# Patient Record
Sex: FEMALE | Race: BLACK OR AFRICAN AMERICAN | Employment: FULL TIME | URBAN - METROPOLITAN AREA
[De-identification: names, ages, dates, MRNs, and addresses within clinical notes are randomized per-mention and may not be internally consistent; named-entity substitution may affect disease eponyms.]

---

## 2017-05-08 ENCOUNTER — HOSPITAL ENCOUNTER (EMERGENCY)
Facility: CLINIC | Age: 27
Discharge: HOME | End: 2017-05-08
Attending: INTERNAL MEDICINE | Admitting: INTERNAL MEDICINE
Payer: COMMERCIAL

## 2017-05-08 VITALS
HEART RATE: 69 BPM | RESPIRATION RATE: 16 BRPM | TEMPERATURE: 97.7 F | OXYGEN SATURATION: 97 % | DIASTOLIC BLOOD PRESSURE: 82 MMHG | WEIGHT: 138.19 LBS | SYSTOLIC BLOOD PRESSURE: 126 MMHG

## 2017-05-08 DIAGNOSIS — J06.9 ACUTE URI: ICD-10-CM

## 2017-05-08 PROCEDURE — 99283 EMERGENCY DEPT VISIT LOW MDM: CPT | Mod: Z6 | Performed by: INTERNAL MEDICINE

## 2017-05-08 PROCEDURE — 99282 EMERGENCY DEPT VISIT SF MDM: CPT | Performed by: INTERNAL MEDICINE

## 2017-05-08 RX ORDER — HYDROXYZINE PAMOATE 50 MG/1
50 CAPSULE ORAL 3 TIMES DAILY PRN
Qty: 30 CAPSULE | Refills: 0 | Status: SHIPPED | OUTPATIENT
Start: 2017-05-08 | End: 2018-01-09

## 2017-05-08 RX ORDER — IBUPROFEN 200 MG
400 TABLET ORAL EVERY 8 HOURS PRN
Qty: 30 TABLET | Refills: 0 | Status: SHIPPED | OUTPATIENT
Start: 2017-05-08 | End: 2017-07-26

## 2017-05-08 ASSESSMENT — ENCOUNTER SYMPTOMS
FEVER: 1
COUGH: 1

## 2017-05-08 NOTE — ED PROVIDER NOTES
"    [facility] EMERGENCY DEPARTMENT ([facility])    May 8, 2017    ED 20 6:34 PM   History     Chief Complaint   Patient presents with     Nasal Congestion     Onset 2 days ago with nasal congestion, runny nose, watery eyes, fever, \"I can't breath my nose is stuffed up.\"     HPI  Tiara Andrade is a 27 year old female who presents with nasal congestion, subjective fevers for the past 2-3 days. She denies itching. She has mild cough. No other complaints. She tried Radha [place] without relief and symptoms got worse this morning. The worst symptoms not being able to breathe from her nose. She asks if she could have a work note.     I have reviewed the Medications, Allergies, Past Medical and Surgical History, and Social History in the [employer] system.  PAST MEDICAL HISTORY: History reviewed. No pertinent past medical history.    PAST SURGICAL HISTORY: History reviewed. No pertinent surgical history.    FAMILY HISTORY: No family history on file.    SOCIAL HISTORY:   Social History   Substance Use Topics     Smoking status: Not on file     Smokeless tobacco: Not on file     Alcohol use Not on file       Discharge Medication List as of 5/8/2017  6:58 PM      START taking these medications    Details   ibuprofen (ADVIL/MOTRIN) 200 MG tablet Take 2 tablets (400 mg) by mouth every 8 hours as needed for mild pain, Disp-30 tablet, R-0, Local Print      hydrOXYzine (VISTARIL) 50 MG capsule Take 1 capsule (50 mg) by mouth 3 times daily as needed for other (congestion), Disp-30 capsule, R-0, Local Print              No Known Allergies     Review of Systems   Constitutional: Positive for fever (subjective).   HENT: Positive for congestion.    Respiratory: Positive for cough.        Physical Exam   BP: 116/69  Pulse: 65  Heart Rate: 65  Temp: 98.2  F (36.8  C)  Resp: 16  Weight: 62.7 kg (138 lb 3 oz)  SpO2: 98 %  Physical Exam   Constitutional: She is oriented to person, place, and time. No distress.   HENT:   Head: Atraumatic. "   Mouth/Throat: Oropharynx is clear and moist. No oropharyngeal exudate.   Eyes: Pupils are equal, round, and reactive to light. No scleral icterus.   Neck: Neck supple. No JVD present.   Cardiovascular: Normal rate, normal heart sounds and intact distal pulses.  Exam reveals no gallop and no friction rub.    No murmur heard.  Pulmonary/Chest: Effort normal and breath sounds normal. No respiratory distress. She has no wheezes. She has no rales. She exhibits no tenderness.   Abdominal: Soft. Bowel sounds are normal. She exhibits no distension and no mass. There is no tenderness. There is no rebound and no guarding.   Musculoskeletal: She exhibits no edema or tenderness.   Neurological: She is alert and oriented to person, place, and time. No cranial nerve deficit.   Skin: Skin is warm. No rash noted. She is not diaphoretic.       ED Course     ED Course     Procedures        No results found for this visit on 05/08/17 (from the past 24 hour(s)).          Labs Ordered and Resulted from Time of ED Arrival Up to the Time of Departure from the ED - No data to display         Assessments & Plan (with Medical Decision Making)  Acute URI, no suggestion of allergic etiology, will start ibuprofen and vistrail prn for congestion, follow up with her PMD prn.       I have reviewed the nursing notes.    I have reviewed the findings, diagnosis, plan and need for follow up with the patient.    Discharge Medication List as of 5/8/2017  6:58 PM      START taking these medications    Details   ibuprofen (ADVIL/MOTRIN) 200 MG tablet Take 2 tablets (400 mg) by mouth every 8 hours as needed for mild pain, Disp-30 tablet, R-0, Local Print      hydrOXYzine (VISTARIL) 50 MG capsule Take 1 capsule (50 mg) by mouth 3 times daily as needed for other (congestion), Disp-30 capsule, R-0, Local Print             Final diagnoses:   Acute URI       5/8/2017   [facility], [place], EMERGENCY DEPARTMENT     Jluis Dubon MD  05/08/17 5977

## 2017-05-08 NOTE — ED AVS SNAPSHOT
[facility], Emergency Department    [address]    [place] [address]    Phone:  [phone]    Fax:  [phone]                                       Tiara Andrade   MRN: [id number]    Department:  [facility], Emergency Department   Date of Visit:  5/8/2017           Patient Information     Date Of Birth          1990        Your diagnoses for this visit were:     Acute URI        You were seen by Jluis Dubon MD.        Discharge Instructions         Viral Upper Respiratory Illness (Adult)  You have a viral upper respiratory illness (URI), which is another term for the common cold. This illness is contagious during the first few days. It is spread through the air by coughing and sneezing. It may also be spread by direct contact (touching the sick person and then touching your own eyes, nose, or mouth). Frequent handwashing will decrease risk of spread. Most viral illnesses go away within 7 to 10 days with rest and simple home remedies. Sometimes the illness may last for several weeks. Antibiotics will not kill a virus, and they are generally not prescribed for this condition.    Home care    If symptoms are severe, rest at home for the first 2 to 3 days. When you resume activity, don't let yourself get too tired.    Avoid being exposed to cigarette smoke (yours or others ).    You may use acetaminophen or ibuprofen to control pain and fever, unless another medicine was prescribed. (Note: If you have chronic liver or kidney disease, have ever had a stomach ulcer or gastrointestinal bleeding, or are taking blood-thinning medicines, talk with your healthcare provider before using these medicines.) Aspirin should never be given to anyone under 18 years of age who is ill with a viral infection or fever. It may cause severe liver or brain damage.    Your appetite may be poor, so a light diet is fine. Avoid dehydration by drinking 6 to 8 glasses of fluids per day (water, soft drinks, juices, tea,  or soup). Extra fluids will help loosen secretions in the nose and lungs.    Over-the-counter cold medicines will not shorten the length of time you re sick, but they may be helpful for the following symptoms: cough, sore throat, and nasal and sinus congestion. (Note: Do not use decongestants if you have high blood pressure.)  Follow-up care  Follow up with your healthcare provider, or as advised.  When to seek medical advice  Call your healthcare provider right away if any of these occur:    Cough with lots of colored sputum (mucus)    Severe headache; face, neck, or ear pain    Difficulty swallowing due to throat pain    Fever of 100.4 F (38 C)  Call 911, or get immediate medical care  Call emergency services right away if any of these occur:    Chest pain, shortness of breath, wheezing, or difficulty breathing    Coughing up blood    Inability to swallow due to throat pain    9972-1156 [employer]. [address] [place] [address]. All rights reserved. This information is not intended as a substitute for professional medical care. Always follow your healthcare professional's instructions.      Please make an appointment to follow up with Your Primary Care Provider in 3-7 days if not improving.     24 Hour Appointment Hotline       To make an appointment at any [facility], call [phone] ([phone]). If you don't have a family doctor or clinic, we will help you find one. [facility] are conveniently located to serve the needs of you and your family.             Review of your medicines      START taking        Dose / Directions Last dose taken    hydrOXYzine 50 MG capsule   Commonly known as:  VISTARIL   Dose:  50 mg   Quantity:  30 capsule        Take 1 capsule (50 mg) by mouth 3 times daily as needed for other (congestion)   Refills:  0        ibuprofen 200 MG tablet   Commonly known as:  ADVIL/MOTRIN   Dose:  400 mg   Quantity:  30 tablet        Take 2 tablets (400 mg)  "by mouth every 8 hours as needed for mild pain   Refills:  0                Prescriptions were sent or printed at these locations (2 Prescriptions)                   Other Prescriptions                Printed at Department/Unit printer (2 of 2)         ibuprofen (ADVIL/MOTRIN) 200 MG tablet               hydrOXYzine (VISTARIL) 50 MG capsule                Orders Needing Specimen Collection     None      Pending Results     No orders found from 2017 to 2017.            Pending Culture Results     No orders found from 2017 to 2017.            Pending Results Instructions     If you had any lab results that were not finalized at the time of your Discharge, you can call the ED Lab Result RN at [phone]. You will be contacted by this team for any positive Lab results or changes in treatment. The nurses are available 7 days a week from 10A to 6:30P.  You can leave a message 24 hours per day and they will return your call.        Thank you for choosing [facility]       Thank you for choosing [facility] for your care. Our goal is always to provide you with excellent care. Hearing back from our patients is one way we can continue to improve our services. Please take a few minutes to complete the written survey that you may receive in the mail after you visit with us. Thank you!        [employer] Information     [employer] lets you send messages to your doctor, view your test results, renew your prescriptions, schedule appointments and more. To sign up, go to [email or web address] . Click on \"Log in\" on the left side of the screen, which will take you to the Welcome page. Then click on \"Sign up Now\" on the right side of the page.     You will be asked to enter the access code listed below, as well as some personal information. Please follow the directions to create your username and password.     Your access code is: [email or web address]  Expires: 2017  6:58 PM     Your access code will  in 90 days. If you need " help or a new code, please call your [facility] clinic or [phone].        Care EveryWhere ID     This is your Care EveryWhere ID. This could be used by other organizations to access your [facility] medical records  [id number]        After Visit Summary       This is your record. Keep this with you and show to your community pharmacist(s) and doctor(s) at your next visit.

## 2017-05-08 NOTE — ED AVS SNAPSHOT
[facility], [place], Emergency Department    [address]    [facility] [address]    Phone:  [phone]    Fax:  [phone]                                       Tiara Andrade   MRN: [id number]    Department:  [facility], Emergency Department   Date of Visit:  5/8/2017           After Visit Summary Signature Page     I have received my discharge instructions, and my questions have been answered. I have discussed any challenges I see with this plan with the nurse or doctor.    ..........................................................................................................................................  Patient/Patient Representative Signature      ..........................................................................................................................................  Patient Representative Print Name and Relationship to Patient    ..................................................               ................................................  Date                                            Time    ..........................................................................................................................................  Reviewed by Signature/Title    ...................................................              ..............................................  Date                                                            Time

## 2017-05-08 NOTE — DISCHARGE INSTRUCTIONS

## 2017-05-08 NOTE — LETTER
[facility], [place], EMERGENCY DEPARTMENT  [address] [facility] [address] [facility] [address]  [phone]    May 8, 2017    Tiara Andrade  No address on file.  There are no phone numbers on file.    : 1990      To Whom it may concern:    Tiara Andrade was seen in our Emergency Department today, May 8, 2017.  I expect her condition to improve over the next 2 days.  She may return to work/school when improved.    Sincerely,        Jluis Dubon MD

## 2017-06-27 ENCOUNTER — OFFICE VISIT (OUTPATIENT)
Dept: FAMILY MEDICINE | Facility: CLINIC | Age: 27
End: 2017-06-27

## 2017-06-27 VITALS
SYSTOLIC BLOOD PRESSURE: 104 MMHG | OXYGEN SATURATION: 96 % | WEIGHT: 136 LBS | HEIGHT: 66 IN | HEART RATE: 71 BPM | RESPIRATION RATE: 18 BRPM | DIASTOLIC BLOOD PRESSURE: 67 MMHG | BODY MASS INDEX: 21.86 KG/M2 | TEMPERATURE: 98.5 F

## 2017-06-27 DIAGNOSIS — F41.1 GAD (GENERALIZED ANXIETY DISORDER): Primary | ICD-10-CM

## 2017-06-27 DIAGNOSIS — K21.9 GASTROESOPHAGEAL REFLUX DISEASE, ESOPHAGITIS PRESENCE NOT SPECIFIED: ICD-10-CM

## 2017-06-27 LAB
HEMOGLOBIN: 14.1 G/DL (ref 11.7–15.7)
TSH SERPL DL<=0.005 MIU/L-ACNC: 0.66 MU/L (ref 0.4–4)

## 2017-06-27 RX ORDER — HYDROXYZINE HYDROCHLORIDE 25 MG/1
25 TABLET, FILM COATED ORAL EVERY 6 HOURS PRN
Qty: 60 TABLET | Refills: 1 | Status: SHIPPED | OUTPATIENT
Start: 2017-06-27 | End: 2018-01-09

## 2017-06-27 ASSESSMENT — ENCOUNTER SYMPTOMS
FEVER: 0
BLOOD IN STOOL: 0
DIARRHEA: 0
DYSPHORIC MOOD: 0
ENDOCRINE NEGATIVE: 1
NAUSEA: 1
CONSTIPATION: 0
SLEEP DISTURBANCE: 0
DECREASED CONCENTRATION: 1
EYES NEGATIVE: 1
CHEST TIGHTNESS: 1
CONFUSION: 0
PALPITATIONS: 1
NERVOUS/ANXIOUS: 1
ACTIVITY CHANGE: 1
FATIGUE: 1
TREMORS: 1
APPETITE CHANGE: 0
UNEXPECTED WEIGHT CHANGE: 0
HEADACHES: 1
HEMATOLOGIC/LYMPHATIC NEGATIVE: 1
ALLERGIC/IMMUNOLOGIC NEGATIVE: 1
HALLUCINATIONS: 0
DIAPHORESIS: 1
ABDOMINAL PAIN: 1
MUSCULOSKELETAL NEGATIVE: 1

## 2017-06-27 ASSESSMENT — ANXIETY QUESTIONNAIRES
3. WORRYING TOO MUCH ABOUT DIFFERENT THINGS: MORE THAN HALF THE DAYS
7. FEELING AFRAID AS IF SOMETHING AWFUL MIGHT HAPPEN: MORE THAN HALF THE DAYS
1. FEELING NERVOUS, ANXIOUS, OR ON EDGE: MORE THAN HALF THE DAYS
2. NOT BEING ABLE TO STOP OR CONTROL WORRYING: MORE THAN HALF THE DAYS
6. BECOMING EASILY ANNOYED OR IRRITABLE: MORE THAN HALF THE DAYS
IF YOU CHECKED OFF ANY PROBLEMS ON THIS QUESTIONNAIRE, HOW DIFFICULT HAVE THESE PROBLEMS MADE IT FOR YOU TO DO YOUR WORK, TAKE CARE OF THINGS AT HOME, OR GET ALONG WITH OTHER PEOPLE: VERY DIFFICULT
5. BEING SO RESTLESS THAT IT IS HARD TO SIT STILL: MORE THAN HALF THE DAYS
GAD7 TOTAL SCORE: 14

## 2017-06-27 ASSESSMENT — PATIENT HEALTH QUESTIONNAIRE - PHQ9: 5. POOR APPETITE OR OVEREATING: MORE THAN HALF THE DAYS

## 2017-06-27 NOTE — PATIENT INSTRUCTIONS
Here is the plan from today's visit    1. JASON (generalized anxiety disorder)  Take hydroxyzine in the morning as needed for anxiety. Make an appointment with therapy, and we will follow-up in one week.  Labs to look for anemia or thyroid problem.  - BEHAVIORAL HEALTH REFERRAL ([facility] interal and external)  - hydrOXYzine (ATARAX) 25 MG tablet; Take 1 tablet (25 mg) by mouth every 6 hours as needed for anxiety  Dispense: 60 tablet; Refill: 1  - TSH with free T4 reflex  - Hemoglobin (HGB) ([facility])    2. Gastroesophageal reflux disease, esophagitis presence not specified  Take zantac twice daily for heart burn. Will follow-up in one week. Decrease coffee intake to help with heart burn and anxiety. Consider quitting tobacco in the future as well to help.  - ranitidine (ZANTAC) 150 MG tablet; Take 1 tablet (150 mg) by mouth 2 times daily  Dispense: 60 tablet; Refill: 11    3. Pap test  Make appointment for follow-up and pap test in one week.    Please call or return to clinic if your symptoms don't go away.    Follow up plan  Schedule an appointment for one week.    Thank you for coming to [facility] today.  Lab Testing:  **If you had lab testing today and your results are reassuring or normal they will be mailed to you or sent through [employer] within 7 days.   **If the lab tests need quick action we will call you with the results.  The phone number we will call with results is # [phone] (home) . If this is not the best number please call our clinic and change the number.  Medication Refills:  If you need any refills please call your pharmacy and they will contact us.   If you need to  your refill at a new pharmacy, please contact the new pharmacy directly. The new pharmacy will help you get your medications transferred faster.   Scheduling:  If you have any concerns about today's visit or wish to schedule another appointment please call our office during normal business hours [phone] (8-5:00  M-F)  If a referral was made to a [facility] and you don't get a call from central scheduling please call [phone].  If a Mammogram was ordered for you at [facility] call [phone] to schedule or change your appointment.  If you had an XRay/CT/Ultrasound/MRI ordered the number is [phone] to schedule or change your radiology appointment.   Medical Concerns:  If you have urgent medical concerns please call [phone] at any time of the day.

## 2017-06-27 NOTE — LETTER
June 29, 2017      Tiara Andrade  [address] [address]        Dear Tiara,    Thank you for getting your care at [facility]. Please see below for your test results.    Your thyroid function is normal, and your hemoglobin (red blood cell) number is normal, you are not anemic.     Resulted Orders   TSH with free T4 reflex   Result Value Ref Range    TSH 0.66 0.40 - 4.00 mU/L   Hemoglobin (HGB) ([facility])   Result Value Ref Range    Hemoglobin 14.1 11.7 - 15.7 g/dL       If you have any questions, please call the clinic to make an appointment with me for a clinic visit.    Sincerely,    Nona Cullen MD

## 2017-06-27 NOTE — PROGRESS NOTES
"      HPI:       Tiara Andrade is a 27 year old woman who presents today to [facility] care. She has previously been seen at [facility] before coming here. She also has a couple of concerns that she wanted to address today. First, over the last year she has been very anxious. She describes feeling scared all the time for no reason. She feels hot and her heart starts racing. She gets tremors and starts to sweat. She feels \"foggy\" and has decreased concentration. Her anxiety is worst on her drives when going to work. She is unable to focus at work and sometimes wants to skip going to work altogether. She describes sometimes having to go upstairs and check her room multiple times before going to work and she does not know why. She has decreased interest in daily activities but denies feeling hopeless. She feels tired all the time and sleeps more than usual. She denies suicidal ideation. She drinks 24 oz of coffee which makes the anxiety worse at times. She was on 5mg adderall daily for ADHD but has not taken it for a month because it makes her anxiety worse. She smokes half a pack of cigarettes per day which helps calm her down. She has never seen a therapist or been on any medications for depression or anxiety. She was taking vistaril for allergies, which made her sleepy and she didn't notice any effects on anxiety. A year ago, she went through a difficult break up. She works in customer service at [employer]. She does not drink or use other drugs. A brain MRI was done in 2016 at [facility] and was normal.    Also, she has had heartburn that has been getting worse lately. She had taken prilosec in the past although not consistently. She has chest and epigastric pain that is worst in the morning and after meals. It is associated with nausea and a sour taste in her mouth. She denies changes in her bowel movements and has one soft stool everyday. She has tried quitting smoking a few times in the past, and this would be " something she would try in the future once her anxiety is under better control.    At [facility], she had a pap test with LSIL and HPV positive cotesting in 12/2015. She has a normal colposcopy on 1/2016 without biopsies. She was due for repeat pap test in 12/2016 but has not gotten one yet. She had a Nexplanon from 12/30/2015 to 3/2016. She does not want anything for contraception at this time and is currently single. She has never been pregnant before.    GERD/Heartburn     Onset: 2-3 years    Description:     Burning in chest:Yes Details: upper abdomen area     Intensity: moderate, severe    Progression of Symptoms: waxing and waning    Accompanying Signs & Symptoms:  Does it feel like food gets stuck:no  Nausea: Yes Details: in morning   Vomiting (bloody?): Yes Details: sometimes the color is green  Abdominal Pain: Yes  Black-Tarry stools:no  Bloody stools: no   History:   Previous ulcers: no    Precipitating factors:   Caffeine use: Yes Details: a lot of coffee  Alcohol use: no  NSAID/Aspirin use: no  Tobacco use: Yes   Worse with caffeinated drinks and no particular food or drink.    Alleviating factors:  Drink water, Details:helps somewhat with Sx.          Therapies Tried and outcome:lifestyle change including: avoiding caffeinated and smoking        Mood Symptoms     Concerns: anxiety    How long have you been feeling this way? One year    Description:   Depressed Mood?:No   Anxious Mood?:  YES     Accompanying Signs & Symptoms:  Still participating in activities that you used to enjoy?: No  Fatigue:  YES - sleeps more than usual  Irritability: No   Difficulty concentrating:  YES difficult to focus at work, history of ADHD not on adderall  Changes in appetite: No   Problems with sleep: No   ++++++++++++++++++++++++++++++++      Substance Use: No        Alcohol Use:never                Other drug use:  Never Used    Social Support           Who do you live with? alone          Who do you turn to for support?  "friends         Resources         What makes you feel better?: friends         What do you do to manage stress? sleep             History  Has there been a time in your life when you needed much less sleep than usual? No   Heart racing/beating fast :  YES especially while driving or going to work  Thoughts of hurting yourself or others: No   Previous treatment Antidepressants - none                                  Therapy: No  Today's PHQ-9 Score:   No flowsheet data found.    JASON Score:  JASON-7 SCORE 6/27/2017   Total Score 14          Problem, Medication and Allergy Lists were reviewed and are current.  Patient is an established patient of this clinic.         Review of Systems:   Review of Systems   Constitutional: Positive for activity change, diaphoresis and fatigue. Negative for appetite change, fever and unexpected weight change.   Eyes: Negative.    Respiratory: Positive for chest tightness.    Cardiovascular: Positive for palpitations.   Gastrointestinal: Positive for abdominal pain and nausea. Negative for blood in stool, constipation and diarrhea.   Endocrine: Negative.    Genitourinary: Negative.    Musculoskeletal: Negative.    Skin: Negative.    Allergic/Immunologic: Negative.    Neurological: Positive for tremors and headaches.   Hematological: Negative.    Psychiatric/Behavioral: Positive for decreased concentration. Negative for confusion, dysphoric mood, hallucinations, self-injury, sleep disturbance and suicidal ideas. The patient is nervous/anxious.              Physical Exam:     Patient Vitals for the past 24 hrs:   BP Temp Temp src Pulse Resp SpO2 Height Weight   06/27/17 1338 104/67 98.5  F (36.9  C) Oral 71 18 96 % 5' 6\" (167.6 cm) 136 lb (61.7 kg)     Body mass index is 21.95 kg/(m^2).  Vitals were reviewed and were normal     Physical Exam   Constitutional: She is oriented to person, place, and time. She appears well-developed and well-nourished. No distress.   HENT:   Head: Normocephalic " and atraumatic.   Mouth/Throat: Oropharynx is clear and moist. No oropharyngeal exudate.   Eyes: Pupils are equal, round, and reactive to light. Right eye exhibits no discharge. Left eye exhibits no discharge. No scleral icterus.   Neck: Normal range of motion. Neck supple.   Cardiovascular: Normal rate, regular rhythm and normal heart sounds.  Exam reveals no gallop and no friction rub.    No murmur heard.  Pulmonary/Chest: Effort normal and breath sounds normal. No respiratory distress. She has no wheezes. She has no rales.   Abdominal: Soft. Bowel sounds are normal. She exhibits no mass. There is tenderness (Mild diffuse tenderness to palpation). There is no rebound and no guarding.   Musculoskeletal: Normal range of motion. She exhibits no edema, tenderness or deformity.   Neurological: She is alert and oriented to person, place, and time. She exhibits normal muscle tone.   Skin: Skin is warm and dry. No rash noted. She is not diaphoretic. No erythema. No pallor.   Psychiatric: Her behavior is normal. Judgment and thought content normal.   Anxious     MENTAL STATUS EXAM  Appearance: appropriate  Attitude: cooperative  Behavior: normal  Eye Contact: normal  Speech: normal  Orientation: oriented to person, place, time and situation  Mood: normal and anxious  Affect: Congruent with mood  Thought Process: appropriate  Suicidal Ideation: reports no suicidal ideation  Hallucination: denies      Results:     Results for orders placed or performed in visit on 06/27/17   TSH with free T4 reflex   Result Value Ref Range    TSH 0.66 0.40 - 4.00 mU/L   Hemoglobin (HGB) ([facility])   Result Value Ref Range    Hemoglobin 14.1 11.7 - 15.7 g/dL         Assessment and Plan     Tiara Andrade is a 27 year old woman who presents today to [facility] care.    1. JASON (generalized anxiety disorder)  Assessment: Her history is most indicative of JASON. Her PHQ9 was 8, and she denies feeling depressed although she lacks motivation and sleeps a  "lot. She has rituals of checking her room several times before she leaves the house as well, which may be an obsessive compulsive component of her anxiety, but sounds more like \"I went into the room then forgot why I went in their\". She denies symptoms of psychosis or suicidal ideation. We spoke to her extensively about starting a medication such as zoloft or cymbalta, and she was very resistant. She has friends on SSRIs, and says that it affects their mood and personality negatively, and she doesn't want that to happen to her. She was open to taking a lower dose of hydroxyzine daily and seeing a therapist though. She agreed to come back for a mood check in one week. We will continue to assess her willingness to begin an SSRI for anxiety. We also counseled her on decreasing her caffeine intake since it is contributing to her anxiety. A TSH and hemoglobin were ordered today as well to evaluate for anemia or hypo/hyperthyroidism as a contributing factor of her symptoms.  Plan:  -     BEHAVIORAL HEALTH REFERRAL ([facility] interal and external)  -     hydrOXYzine (ATARAX) 25 MG tablet; Take 1 tablet (25 mg) by mouth every 6 hours as needed for anxiety  -     TSH with free T4 reflex  -     Hemoglobin (HGB) ([facility])  - Decrease caffeine intake  - Follow-up in one week for mood check    2. Gastroesophageal reflux disease, esophagitis presence not specified  Assessment: Her symptoms of heartburn in the morning is most consistent with GERD. She denies other complicating factors such as melena, diarrhea, constipation, or frequent vomiting. In the past, she has taken omeprazole inconsistently. Today, we prescribed zantac instead and will continue to reassess if we should switch to something stronger like omeprazole. We also talked about behavioral changes such as elevating her head at night, tobacco cessation, and decreased caffeine intake.  Plan:  -     ranitidine (ZANTAC) 150 MG tablet; Take 1 tablet (150 mg) by mouth 2 " times daily  - Decrease caffeine and tobacco use  - Elevate head of bed at night    3. Pap test  Assessment: History of LSIL in 2015 with negative colposcopy in 2016. Per [facility] records, she was due for a repeat pap with co-test 12/2016. She will make an appointment to do this soon. If this is normal, she can go back to routine screening per ASCCP guidelines.  Plan:   - pap at next appointment    There are no discontinued medications.     Options for treatment and follow-up care were reviewed with the patient. Tiara Andrade  engaged in the decision making process and verbalized understanding of the options discussed and agreed with the final plan.    Karlie Jean, MS4    I acted as a scribe during this encounter with Dr. Nona Cullen MD.    The medical student acted as scribe and the encounter documented above was completely performed by myself and the documentation reflects the work I have performed today. MD Nona Encinas MD   of [facility]

## 2017-06-27 NOTE — MR AVS SNAPSHOT
After Visit Summary   6/27/2017    Tiara Andrade    MRN: [id number]           Patient Information     Date Of Birth          1990        Visit Information        Provider Department      6/27/2017 1:40 PM Nona Cullen MD [facility]        Today's Diagnoses     JASON (generalized anxiety disorder)    -  1    Gastroesophageal reflux disease, esophagitis presence not specified          Care Instructions    Here is the plan from today's visit    1. JASON (generalized anxiety disorder)  Take hydroxyzine in the morning as needed for anxiety. Make an appointment with therapy, and we will follow-up in one week.  Labs to look for anemia or thyroid problem.  - BEHAVIORAL HEALTH REFERRAL ([facility] interal and external)  - hydrOXYzine (ATARAX) 25 MG tablet; Take 1 tablet (25 mg) by mouth every 6 hours as needed for anxiety  Dispense: 60 tablet; Refill: 1  - TSH with free T4 reflex  - Hemoglobin (HGB) ([facility])    2. Gastroesophageal reflux disease, esophagitis presence not specified  Take zantac twice daily for heart burn. Will follow-up in one week. Decrease coffee intake to help with heart burn and anxiety. Consider quitting tobacco in the future as well to help.  - ranitidine (ZANTAC) 150 MG tablet; Take 1 tablet (150 mg) by mouth 2 times daily  Dispense: 60 tablet; Refill: 11    3. Pap test  Make appointment for follow-up and pap test in one week.    Please call or return to clinic if your symptoms don't go away.    Follow up plan  Schedule an appointment for one week.    Thank you for coming to [facility] today.  Lab Testing:  **If you had lab testing today and your results are reassuring or normal they will be mailed to you or sent through [employer] within 7 days.   **If the lab tests need quick action we will call you with the results.  The phone number we will call with results is # [phone] (home) . If this is not the best number please call our clinic and change the  number.  Medication Refills:  If you need any refills please call your pharmacy and they will contact us.   If you need to  your refill at a new pharmacy, please contact the new pharmacy directly. The new pharmacy will help you get your medications transferred faster.   Scheduling:  If you have any concerns about today's visit or wish to schedule another appointment please call our office during normal business hours [phone] (8-5:00 M-F)  If a referral was made to a [facility] and you don't get a call from central scheduling please call [phone].  If a Mammogram was ordered for you at [facility] call [phone] to schedule or change your appointment.  If you had an XRay/CT/Ultrasound/MRI ordered the number is [phone] to schedule or change your radiology appointment.   Medical Concerns:  If you have urgent medical concerns please call [phone] at any time of the day.            Follow-ups after your visit        Additional Services     BEHAVIORAL HEALTH REFERRAL ([facility] interal and external)       Referring MD: KAYA STAUFFER    May leave message on voicemail: Yes  PHQ-9 Score:   GAD7 Score: 14                  Who to contact     Please call your clinic at [phone] to:    Ask questions about your health    Make or cancel appointments    Discuss your medicines    Learn about your test results    Speak to your doctor   If you have compliments or concerns about an experience at your clinic, or if you wish to file a complaint, please contact [facility] Patient Relations at [phone] or email us at [email or web address]         Additional Information About Your Visit        [employer] Information     [employer] is an electronic gateway that provides easy, online access to your medical records. With [employer], you can request a clinic appointment, read your test results, renew a prescription or communicate with your care  "team.     To sign up for [employer] visit the website at [email or web address]   You will be asked to enter the access code listed below, as well as some personal information. Please follow the directions to create your username and password.     Your access code is: [email or web address]  Expires: 2017  6:58 PM     Your access code will  in 90 days. If you need help or a new code, please contact your [facility] or call [phone] for assistance.        Care EveryWhere ID     This is your Care EveryWhere ID. This could be used by other organizations to access your [place] medical records  [id number]        Your Vitals Were     Pulse Temperature Respirations Height Last Period Pulse Oximetry    71 98.5  F (36.9  C) (Oral) 18 5' 6\" (167.6 cm) 2017 96%    Breastfeeding? BMI (Body Mass Index)                No 21.95 kg/m2           Blood Pressure from Last 3 Encounters:   17 104/67   17 126/82    Weight from Last 3 Encounters:   17 136 lb (61.7 kg)   17 138 lb 3 oz (62.7 kg)              We Performed the Following     BEHAVIORAL HEALTH REFERRAL ([facility] interal and external)     Hemoglobin (HGB) ([facility])     TSH with free T4 reflex          Today's Medication Changes          These changes are accurate as of: 17  2:48 PM.  If you have any questions, ask your nurse or doctor.               Start taking these medicines.        Dose/Directions    hydrOXYzine 25 MG tablet   Commonly known as:  ATARAX   Used for:  JASON (generalized anxiety disorder)   Started by:  Nona Cullen MD        Dose:  25 mg   Take 1 tablet (25 mg) by mouth every 6 hours as needed for anxiety   Quantity:  60 tablet   Refills:  1       ranitidine 150 MG tablet   Commonly known as:  ZANTAC   Used for:  Gastroesophageal reflux disease, esophagitis presence not specified   Started by:  Nona Cullen MD        Dose:  150 mg   Take 1 tablet (150 mg) by mouth 2 times " daily   Quantity:  60 tablet   Refills:  11            Where to get your medicines      These medications were sent to [facility] [place], [place] - [address] [facility] [address]     Phone:  [phone]     hydrOXYzine 25 MG tablet    ranitidine 150 MG tablet                Primary Care Provider    Physician No Ref-Primary       No address on file        Equal Access to Services     [facility] : Hadii aad ku hadasho Soomaali, waaxda luqadaha, qaybta kaalmada adeegyada, waxay idiin hayaan adeeg khhannash laberyl . So [facility] [phone].    ATENCIÓN: Si habla español, tiene a whyte disposición servicios gratuitos de asistencia lingüística. Abida al [phone].    We comply with applicable federal civil rights laws and [place] laws. We do not discriminate on the basis of race, color, national origin, age, disability sex, sexual orientation or gender identity.            Thank you!     Thank you for choosing [facility]  for your care. Our goal is always to provide you with excellent care. Hearing back from our patients is one way we can continue to improve our services. Please take a few minutes to complete the written survey that you may receive in the mail after your visit with us. Thank you!             Your Updated Medication List - Protect others around you: Learn how to safely use, store and throw away your medicines at www.disposemymeds.org.          This list is accurate as of: 6/27/17  2:48 PM.  Always use your most recent med list.                   Brand Name Dispense Instructions for use Diagnosis    hydrOXYzine 25 MG tablet    ATARAX    60 tablet    Take 1 tablet (25 mg) by mouth every 6 hours as needed for anxiety    JASON (generalized anxiety disorder)       hydrOXYzine 50 MG capsule    VISTARIL    30 capsule    Take 1 capsule (50 mg) by mouth 3 times daily as needed for other (congestion)        ibuprofen 200 MG tablet    ADVIL/MOTRIN    30  tablet    Take 2 tablets (400 mg) by mouth every 8 hours as needed for mild pain        ranitidine 150 MG tablet    ZANTAC    60 tablet    Take 1 tablet (150 mg) by mouth 2 times daily    Gastroesophageal reflux disease, esophagitis presence not specified

## 2017-06-28 ENCOUNTER — TELEPHONE (OUTPATIENT)
Dept: FAMILY MEDICINE | Facility: CLINIC | Age: 27
End: 2017-06-28

## 2017-06-28 ASSESSMENT — ANXIETY QUESTIONNAIRES: GAD7 TOTAL SCORE: 14

## 2017-06-28 NOTE — LETTER
July 5, 2017    Tiara Andrade  [address] [address]      Dear: Tiara Andrade    You were recently referred for a Behavioral Health appointment by your primary care provider at [facility].  We have called twice to schedule this appointment, but have been unable to reach you.  If you are interested in receiving this service, please call [facility] at [phone].  We would be happy to schedule this appointment for you.      Thank you.      Sincerely,    Patient Representative    [facility]  Hours:  Monday-Friday 8:00 am - 5:00 pm   Phone Number: [phone]

## 2017-07-12 ENCOUNTER — TELEPHONE (OUTPATIENT)
Dept: FAMILY MEDICINE | Facility: CLINIC | Age: 27
End: 2017-07-12

## 2017-07-12 NOTE — TELEPHONE ENCOUNTER
[facility] phone call message- patient requesting results:    Test: Lab    Date of test: 06/27/2017     Additional Comments: patient is requesting result from above date.     OK to leave a message on voice mail? Yes    Primary language: English      needed? No    Call taken on July 12, 2017 at 12:04 PM by Tiffanie Boyd

## 2017-07-12 NOTE — TELEPHONE ENCOUNTER
Returned Pt's call and reviewed Letter sent on June 29th by Dr. Cullen. Pt verbalized understanding and denies any questions.     Donis Seth RN

## 2017-07-14 ENCOUNTER — OFFICE VISIT (OUTPATIENT)
Dept: FAMILY MEDICINE | Facility: CLINIC | Age: 27
End: 2017-07-14

## 2017-07-14 VITALS
HEART RATE: 70 BPM | HEIGHT: 66 IN | TEMPERATURE: 98 F | BODY MASS INDEX: 21.92 KG/M2 | OXYGEN SATURATION: 98 % | WEIGHT: 136.4 LBS | DIASTOLIC BLOOD PRESSURE: 65 MMHG | SYSTOLIC BLOOD PRESSURE: 98 MMHG | RESPIRATION RATE: 20 BRPM

## 2017-07-14 DIAGNOSIS — F41.1 GAD (GENERALIZED ANXIETY DISORDER): ICD-10-CM

## 2017-07-14 DIAGNOSIS — Z00.00 HEALTHCARE MAINTENANCE: Primary | ICD-10-CM

## 2017-07-14 LAB
BACTERIA: NORMAL
CLUE CELLS: NORMAL
MOTILE TRICHOMONAS: NEGATIVE
ODOR: NORMAL
PH WET PREP: <4.5
WBC WET PREP: NORMAL
YEAST: NORMAL

## 2017-07-14 ASSESSMENT — ENCOUNTER SYMPTOMS
ACTIVITY CHANGE: 0
APPETITE CHANGE: 0
FATIGUE: 0
UNEXPECTED WEIGHT CHANGE: 0
DYSPHORIC MOOD: 0
SLEEP DISTURBANCE: 1
NERVOUS/ANXIOUS: 1

## 2017-07-14 ASSESSMENT — ANXIETY QUESTIONNAIRES
1. FEELING NERVOUS, ANXIOUS, OR ON EDGE: SEVERAL DAYS
7. FEELING AFRAID AS IF SOMETHING AWFUL MIGHT HAPPEN: SEVERAL DAYS
5. BEING SO RESTLESS THAT IT IS HARD TO SIT STILL: MORE THAN HALF THE DAYS
2. NOT BEING ABLE TO STOP OR CONTROL WORRYING: SEVERAL DAYS
6. BECOMING EASILY ANNOYED OR IRRITABLE: SEVERAL DAYS
GAD7 TOTAL SCORE: 9
3. WORRYING TOO MUCH ABOUT DIFFERENT THINGS: SEVERAL DAYS

## 2017-07-14 ASSESSMENT — PATIENT HEALTH QUESTIONNAIRE - PHQ9: 5. POOR APPETITE OR OVEREATING: MORE THAN HALF THE DAYS

## 2017-07-14 NOTE — LETTER
July 26, 2017      Tiara Andrade  [address] [address]        Dear Tiara,    Thank you for getting your care at [facility]. Please see below for your test results.  1. STD screening all negative  2. Your pap/HPV cotest was negative. Next pap/HPV cotest due in 3 years  Resulted Orders   Pap imaged thin layer screen reflex to HPV if ASCUS - recommend age 25 - 29   Result Value Ref Range    PAP NIL     Copath Report         Acc#: [id number]   Signed: 7/20/2017 09:22   MR#: [id number]    SPECIMEN/STAIN PROCESS:  Pap imaged thin layer prep screening (Surepath, FocalPoint with guided  screening)       Pap-Cyto x 1, Pap with reflex to HPV if ASCUS x 1    SOURCE: Cervical, endocervical  ----------------------------------------------------------------   Pap imaged thin layer prep screening (Surepath, FocalPoint with guided  screening)  SPECIMEN ADEQUACY:  Satisfactory for evaluation.  -Transformation zone component present.    CYTOLOGIC INTERPRETATION:    Negative for intraepithelial lesion or malignancy    Electronically signed by:  VERONIQUE Aleman  (ASCP)    CLINICAL HISTORY:  LMP: 6/1/17    Papanicolaou Test Limitations:  Cervical cytology is a screening test  with limited sensitivity; regular screening is critical for cancer  prevention; Pap tests are primarily effective for the  diagnosis/prevention of squamous cell carcinoma, not adenocarcinomas or  other cancers.  TESTING LAB LOCATION:  [address] [address], [phone]  Processed and screened at [facility],  [facility], [facility]     NEISSERIA GONORRHOEA PCR   Result Value Ref Range    Specimen Descrip Urine     N Gonorrhea PCR  NEG     Negative   Negative for N. gonorrhoeae rRNA by transcription mediated amplification.   A negative result by transcription mediated amplification does not preclude the   presence of N.  gonorrhoeae infection because results are dependent on proper   and adequate collection, absence of inhibitors, and sufficient rRNA to be   detected.     CHLAMYDIA TRACHOMATIS PCR   Result Value Ref Range    Specimen Description Urine     Chlamydia Trachomatis PCR  NEG     Negative   Negative for C. trachomatis rRNA by transcription mediated amplification.   A negative result by transcription mediated amplification does not preclude the   presence of C. trachomatis infection because results are dependent on proper   and adequate collection, absence of inhibitors, and sufficient rRNA to be   detected.     Wet Prep ([facility])   Result Value Ref Range    Yeast Wet Prep None none    Motile Trichomonas Wet Prep Negative Negative    Clue Cells Wet Prep None NONE    WBC WET PREP None 2 - 5    Bacteria Wet Prep Few None    pH Wet Prep <4.5 3.8 - 4.5    Odor Wet Prep None NONE       If you have any concerns about these results please call and leave a message for me or send a [facility] message to the clinic.    Sincerely,    Nona Cullen MD

## 2017-07-14 NOTE — MR AVS SNAPSHOT
After Visit Summary   7/14/2017    Tiara Andrade    MRN: [id number]           Patient Information     Date Of Birth          1990        Visit Information        Provider Department      7/14/2017 3:00 PM Nona Cullen MD [facility]        Today's Diagnoses     Healthcare maintenance    -  1      Care Instructions    Here is the plan from today's visit    1. Healthcare maintenance  If normal, only need a pap on 7/14/2020 then only every 5 years after.  - Pap imaged thin layer screen reflex to HPV if ASCUS - recommend age 25 - 29    2. Anxiety  Take 25mg hydroxyzine in morning and see how you do. Keep taking it at night. Feel free to split the tabs in half. Can take every 6 hours as needed. Make an appointment with the behavioral health specialist.    Please call or return to clinic if your symptoms don't go away.    Follow up plan: Return in 4 weeks after seeing the therapist      Thank you for coming to [facility] today.  Lab Testing:  **If you had lab testing today and your results are reassuring or normal they will be mailed to you or sent through [employer] within 7 days.   **If the lab tests need quick action we will call you with the results.  The phone number we will call with results is # [phone] (home) . If this is not the best number please call our clinic and change the number.  Medication Refills:  If you need any refills please call your pharmacy and they will contact us.   If you need to  your refill at a new pharmacy, please contact the new pharmacy directly. The new pharmacy will help you get your medications transferred faster.   Scheduling:  If you have any concerns about today's visit or wish to schedule another appointment please call our office during normal business hours [phone] (8-5:00 M-F)  If a referral was made to a [facility] and you don't get a call from central scheduling please call [phone].  If  "a Mammogram was ordered for you at [facility] call [phone] to schedule or change your appointment.  If you had an XRay/CT/Ultrasound/MRI ordered the number is [phone] to schedule or change your radiology appointment.   Medical Concerns:  If you have urgent medical concerns please call [phone] at any time of the day.            Follow-ups after your visit        Who to contact     Please call your clinic at [phone] to:    Ask questions about your health    Make or cancel appointments    Discuss your medicines    Learn about your test results    Speak to your doctor   If you have compliments or concerns about an experience at your clinic, or if you wish to file a complaint, please contact [facility] Patient Relations at [phone] or email us at [email or web address]         Additional Information About Your Visit        [employer] Information     [employer] is an electronic gateway that provides easy, online access to your medical records. With [employer], you can request a clinic appointment, read your test results, renew a prescription or communicate with your care team.     To sign up for [employer] visit the website at [email or web address]   You will be asked to enter the access code listed below, as well as some personal information. Please follow the directions to create your username and password.     Your access code is: [email or web address]  Expires: 2017  6:58 PM     Your access code will  in 90 days. If you need help or a new code, please contact your [facility] or call [phone] for assistance.        Care EveryWhere ID     This is your Care EveryWhere ID. This could be used by other organizations to access your [facility] medical records  [id number]        Your Vitals Were     Pulse Temperature Respirations Height Last Period Pulse Oximetry    70 98  F (36.7  C) (Oral) 20 5' 6\" (167.6 cm) 2017 98%    " BMI (Body Mass Index)                   22.02 kg/m2            Blood Pressure from Last 3 Encounters:   07/14/17 98/65   06/27/17 104/67   05/08/17 126/82    Weight from Last 3 Encounters:   07/14/17 136 lb 6.4 oz (61.9 kg)   06/27/17 136 lb (61.7 kg)   05/08/17 138 lb 3 oz (62.7 kg)              We Performed the Following     Pap imaged thin layer screen reflex to HPV if ASCUS - recommend age 25 - 29        Primary Care Provider Office Phone # Fax #    Nona Ezequiel Cullen [phone]       [facility] [address] [facility] [address]        Equal Access to Services     ABRAHAM MARTINEZ : Vilma Seals, jaylin yin, marquise kapadiamaderik roth, lydia bonilla. So [facility] [phone].    ATENCIÓN: Si habla español, tiene a whyte disposición servicios gratuitos de asistencia lingüística. Llame al [phone].    We comply with applicable federal civil rights laws and [place] laws. We do not discriminate on the basis of race, color, national origin, age, disability sex, sexual orientation or gender identity.            Thank you!     Thank you for choosing [facility]  for your care. Our goal is always to provide you with excellent care. Hearing back from our patients is one way we can continue to improve our services. Please take a few minutes to complete the written survey that you may receive in the mail after your visit with us. Thank you!             Your Updated Medication List - Protect others around you: Learn how to safely use, store and throw away your medicines at www.disposemymeds.org.          This list is accurate as of: 7/14/17  4:15 PM.  Always use your most recent med list.                   Brand Name Dispense Instructions for use Diagnosis    hydrOXYzine 25 MG tablet    ATARAX    60 tablet    Take 1 tablet (25 mg) by mouth every 6 hours as needed for anxiety    JASON (generalized anxiety disorder)       hydrOXYzine 50 MG  capsule    VISTARIL    30 capsule    Take 1 capsule (50 mg) by mouth 3 times daily as needed for other (congestion)        ibuprofen 200 MG tablet    ADVIL/MOTRIN    30 tablet    Take 2 tablets (400 mg) by mouth every 8 hours as needed for mild pain        ranitidine 150 MG tablet    ZANTAC    60 tablet    Take 1 tablet (150 mg) by mouth 2 times daily    Gastroesophageal reflux disease, esophagitis presence not specified

## 2017-07-14 NOTE — PATIENT INSTRUCTIONS
Here is the plan from today's visit    1. Healthcare maintenance  If normal, only need a pap on 7/14/2020 then only every 5 years after.  - Pap imaged thin layer screen reflex to HPV if ASCUS - recommend age 25 - 29    2. Anxiety  Take 25mg hydroxyzine in morning and see how you do. Keep taking it at night. Feel free to split the tabs in half. Can take every 6 hours as needed. Make an appointment with the behavioral health specialist.    Please call or return to clinic if your symptoms don't go away.    Follow up plan: Return in 4 weeks after seeing the therapist      Thank you for coming to [facility] today.  Lab Testing:  **If you had lab testing today and your results are reassuring or normal they will be mailed to you or sent through [employer] within 7 days.   **If the lab tests need quick action we will call you with the results.  The phone number we will call with results is # [phone] (home) . If this is not the best number please call our clinic and change the number.  Medication Refills:  If you need any refills please call your pharmacy and they will contact us.   If you need to  your refill at a new pharmacy, please contact the new pharmacy directly. The new pharmacy will help you get your medications transferred faster.   Scheduling:  If you have any concerns about today's visit or wish to schedule another appointment please call our office during normal business hours [phone] (8-5:00 M-F)  If a referral was made to a [facility] and you don't get a call from central scheduling please call [phone].  If a Mammogram was ordered for you at [facility] call [phone] to schedule or change your appointment.  If you had an XRay/CT/Ultrasound/MRI ordered the number is [phone] to schedule or change your radiology appointment.   Medical Concerns:  If you have urgent medical concerns please call [phone] at any time of the day.

## 2017-07-14 NOTE — PROGRESS NOTES
HPI:       Tiara Andrade is a 27 year old woman who presents today for pap smear and anxiety follow-up. At her last visit, she was started on 25mg hydroxyzine daily to help with anxiety. She has been taking 50mg at night and has not tried taking it during the day since she is nervous that it will make her sleepy. She reports that taking the hydroxizine every night has made her feel more rested. Previously, even if she slept, she felt as though her brain was awake all night. Now she feels like she actually slept throughout the night. She was hesitant to start an SSRI or other anxiolytic due to negative experiences her friends have had. However, she was agreeable to making a behavioral health appointment. She has not made an appointment yet but states that she will on her way out today. She has felt panicked a few times per week, but this is an improvement since she was feeling panicked every day. She was drinking 24 oz of coffee daily, which she admit was making her anxiety worse. Over the last couple of weeks, she has cut back to 16 oz. She smokes 7-8 cigarettes per day and would like to cut back eventually once her anxiety is more controlled since smoking is one of her coping mechanisms at this time.    She also is due for a pap today. She has a history of LSIL in 2015 with negative colposcopy in 2016. Per [place] records, she was due for a repeat pap with co-test 12/2016. Her LMP was 7/1/17, and she gets regular periods every month. She is not currently sexually active. The last time she was sexually active was 3 months ago, and she used condoms.    Depression/Anxiety  Follow-Up    Status since last visit: Improved with nightly hydroxyzine    What is going well? Sleeping better    Life Stressors: work     Other associated symptoms:None    New Significant life event:No    Current substance abuse: None    Anxiety / Panic symptoms: Yes-  Nervousness, worrying, trouble relaxing, restlessness, irritable, intense fear at  "times    Recent PHQ-9 Scores: 3 today   No flowsheet data found.  Recent JASON-7 Scores: 9 today     JASON-7 SCORE 6/27/2017   Total Score 14         Today' s JASON-7 Reviewed and it is improving       Adherence and Exercise  Medication side effects: yes: fatigue  How often is a medication missed? Less than 1 time per week  Are you able to follow the treatment plan? Yes    Problem, Medication and Allergy Lists were reviewed and are current.  Patient is an established patient of this clinic.         Review of Systems:   Review of Systems   Constitutional: Negative for activity change, appetite change, fatigue and unexpected weight change.   Genitourinary: Negative for menstrual problem and vaginal bleeding.   Psychiatric/Behavioral: Positive for sleep disturbance. Negative for dysphoric mood, self-injury and suicidal ideas. The patient is nervous/anxious.         Sleep disturbance is improving since last visit, more rested in the morning             Physical Exam:     Patient Vitals for the past 24 hrs:   BP Temp Temp src Pulse Resp SpO2 Height Weight   07/14/17 1521 98/65 98  F (36.7  C) Oral 70 20 98 % 5' 6\" (167.6 cm) 136 lb 6.4 oz (61.9 kg)     Body mass index is 22.02 kg/(m^2).  Vitals were reviewed and were normal     Physical Exam   Constitutional: She appears well-developed and well-nourished. No distress.   HENT:   Head: Normocephalic and atraumatic.   Eyes: Right eye exhibits no discharge. Left eye exhibits no discharge. No scleral icterus.   Neck: Normal range of motion.   Abdominal: Soft. She exhibits no distension.   Genitourinary: Vagina normal. No vaginal discharge found.   Genitourinary Comments: Normal nulliparous cervix visualized by speculum exam, no external lesions   Musculoskeletal: Normal range of motion. She exhibits no edema, tenderness or deformity.   Neurological: She is alert. She exhibits normal muscle tone.   Skin: Skin is warm and dry. No rash noted. She is not diaphoretic. No erythema. No " pallor.   Psychiatric: She has a normal mood and affect. Her behavior is normal. Judgment and thought content normal.      MENTAL STATUS EXAM  Appearance: appropriate  Attitude: cooperative  Behavior: normal  Eye Contact: normal  Speech: normal  Orientation: oriented to person, place, time and situation  Mood: normal and anxious  Affect: Congruent with mood  Thought Process: appropriate  Suicidal Ideation: reports no suicidal ideation  Hallucination: denies      Results:      Results from the last 24 hoursNo results found for this or any previous visit (from the past 24 hour(s)).     Assessment and Plan     Tiara Andrade is a 27 year old woman who presents today for anxiety follow-up and pelvic exam.    1. Healthcare maintenance  Assessment: If normal, she will need a pap around 7/14/2020. After that, she will only need pap with HPV co-testing every 5 years per ASCCP recommendations.  Plan:  -     Pap imaged thin layer screen reflex to HPV if ASCUS - recommend age 25 - 29  - GC/Chlam vaginal swab and wet prep collected    2. Anxiety  Assessment: Her anxiety is improving with nightly hydroxyzine especially with regard to sleeping better. She is only feeling anxious a couple of times per week during the day. She is still hesitant to start SSRIs, so we discussed trialing 25mg hydroxyzine during the day and seeing if that helps. She is also scheduling an appointment with behavioral health.  Plan:   - continue 50mg PO hydroxyzine at night   - start 25mg PO hydroxyzine during the day as well and PRN q6h   - schedule appointment with behavioral health   - follow-up in 4 weeks for anxiety    There are no discontinued medications.  Options for treatment and follow-up care were reviewed with the patient. Tiara Andrade  engaged in the decision making process and verbalized understanding of the options discussed and agreed with the final plan.    Karlie Jean, MS4    I acted as a scribe during this encounter with Dr. Nona Nieves  MD Subhash.    The medical student acted as scribe and the encounter documented above was completely performed by myself and the documentation reflects the work I have performed today. MD Nona Encnias MD  [facility]

## 2017-07-15 ASSESSMENT — ANXIETY QUESTIONNAIRES: GAD7 TOTAL SCORE: 9

## 2017-07-15 ASSESSMENT — PATIENT HEALTH QUESTIONNAIRE - PHQ9: SUM OF ALL RESPONSES TO PHQ QUESTIONS 1-9: 3

## 2017-07-16 LAB
C TRACH DNA SPEC QL NAA+PROBE: NORMAL
N GONORRHOEA DNA SPEC QL NAA+PROBE: NORMAL
SPECIMEN SOURCE: NORMAL
SPECIMEN SOURCE: NORMAL

## 2017-07-17 PROBLEM — F41.1 GAD (GENERALIZED ANXIETY DISORDER): Status: ACTIVE | Noted: 2017-07-17

## 2017-07-20 LAB
COPATH REPORT: NORMAL
PAP: NORMAL

## 2017-07-26 ENCOUNTER — OFFICE VISIT (OUTPATIENT)
Dept: FAMILY MEDICINE | Facility: CLINIC | Age: 27
End: 2017-07-26

## 2017-07-26 VITALS
OXYGEN SATURATION: 98 % | WEIGHT: 136.6 LBS | SYSTOLIC BLOOD PRESSURE: 120 MMHG | HEART RATE: 56 BPM | DIASTOLIC BLOOD PRESSURE: 72 MMHG | BODY MASS INDEX: 22.05 KG/M2 | TEMPERATURE: 97.5 F | RESPIRATION RATE: 16 BRPM

## 2017-07-26 DIAGNOSIS — N94.6 DYSMENORRHEA: Primary | ICD-10-CM

## 2017-07-26 RX ORDER — KETOROLAC TROMETHAMINE 30 MG/ML
30 INJECTION, SOLUTION INTRAMUSCULAR; INTRAVENOUS ONCE
Qty: 1 ML | Refills: 0 | OUTPATIENT
Start: 2017-07-26 | End: 2017-07-26

## 2017-07-26 RX ORDER — IBUPROFEN 600 MG/1
600 TABLET, FILM COATED ORAL EVERY 6 HOURS
Qty: 60 TABLET | Refills: 0 | Status: SHIPPED | OUTPATIENT
Start: 2017-07-26 | End: 2018-07-02

## 2017-07-26 RX ORDER — PYRIDOXINE HCL (VITAMIN B6) 50 MG
50 TABLET ORAL DAILY
Qty: 60 TABLET | Refills: 3 | Status: SHIPPED | OUTPATIENT
Start: 2017-07-26 | End: 2018-04-02

## 2017-07-26 ASSESSMENT — ENCOUNTER SYMPTOMS
ABDOMINAL PAIN: 1
NAUSEA: 1
DIARRHEA: 1
VOMITING: 1
ACTIVITY CHANGE: 1

## 2017-07-26 NOTE — PROGRESS NOTES
HPI:       Tiara Andrade is a 27 year old who presents for the following  Patient presents with:  Abdominal Pain: everytime she has her period she gets cramps/ vomits for 2 days. Has tried midol which helps and does not want birth control to regulate.    Patient is complaining of pain with menses that start 2 days prior to the start of menses and and last 3 days after. Pain is 8/10 in severity, localized to the lower abdomen both in LLQ and RLQ. Pain is associated with nausea, vomiting, and diarrhea. Patient had her first menses at the age of 16 and since then it always has been painful, never sought medical advise. Patient took some over the counter medication in the past which did not help. Patient also had a patch which caused severe bleeding for 3 consecutive months and removed it after 3 months. Patient is also c/o vaginal dryness and dyspareunia. Has not been sexually active for 8 months, and never had STDs.        Problem, Medication and Allergy Lists were reviewed and are current.  Patient is an established patient of this clinic.         Review of Systems:   Review of Systems   Constitutional: Positive for activity change.   Gastrointestinal: Positive for abdominal pain, diarrhea, nausea and vomiting.   Genitourinary: Positive for dyspareunia, menstrual problem, pelvic pain, vaginal bleeding and vaginal pain.             Physical Exam:   Patient Vitals for the past 24 hrs:   BP Temp Temp src Pulse Resp SpO2 Weight   07/26/17 0806 120/72 97.5  F (36.4  C) Oral 56 16 98 % 136 lb 9.6 oz (62 kg)     Body mass index is 22.05 kg/(m^2).  Vitals were reviewed and were normal  Blood pressure 120/72, pulse 56, temperature 97.5  F (36.4  C), temperature source Oral, resp. rate 16, weight 62 kg (136 lb 9.6 oz), last menstrual period 07/24/2017, SpO2 98 %, not currently breastfeeding.       Physical Exam   Constitutional: She appears well-developed and well-nourished. No distress.   Cardiovascular: Normal rate,  regular rhythm and normal heart sounds.    Pulmonary/Chest: Effort normal and breath sounds normal.   Abdominal: Soft. Bowel sounds are normal. She exhibits no distension. There is tenderness (pain with deep palpation R>L side but no masses palpated. ). There is no rebound and no guarding.         Results:      Results from the last 24 hoursNo results found for this or any previous visit (from the past 24 hour(s)).  Assessment and Plan     ## Dysmenorrhea  DDx: premenstrual syndrome vs Endometriosis vs adenomyosis vs leiomyoma   Given the long symptoms of dysmenorrhea associated with the onset of menses makes it likely to be PMS. Patient is also having pain during intercourse with both deep and superficial penetrations which makes Endometriosis also a differential diagnosis. Highly unlikely to be leiomyoma or adenomyosis as there is no pain with physical exam of abdomen and no uterus mass are palpated.     PLAN:   - ibuprofen (ADVIL/MOTRIN) 600 MG tablet; Take 1 tablet (600 mg) by mouth every 6 hours  Dispense: 60 tablet; Refill: 0  Take medication 2 days prior to menses and 3 days after. Take as needed thereafter.   - pyridOXINE (VITAMIN B-6) 50 MG tablet; Take 1 tablet (50 mg) by mouth daily  Dispense: 60 tablet; Refill: 3  - US Pelvis Cmpl wo Transvaginal w Abd/Pel Duplex Lmt; Future  - ketorolac (TORADOL) 30 MG/ML injection; Inject 1 mL (30 mg) into the muscle once for 1 dose  Dispense: 1 mL; Refill: 0  - Will follow up patient in 2 weeks again if the NSAID has worked and will consider OCP if the primary treatment fails. Patient is open to getting OCP at this stage.   There are no discontinued medications.  Options for treatment and follow-up care were reviewed with the patient. Tiara Andrade  engaged in the decision making process and verbalized understanding of the options discussed and agreed with the final plan.    Reji Byrne MD  Family Medicine PGY-1

## 2017-07-26 NOTE — LETTER
7/26/2017       RE: Tiara Andrade  [address] [address]     Dear Colleague,    Thank you for referring your patient, Tiara Andrade, to the [facility] at [facility]. Please see a copy of my visit note below.          HPI:       Tiara Andrade is a 27 year old who presents for the following  Patient presents with:  Abdominal Pain: everytime she has her period she gets cramps/ vomits for 2 days. Has tried midol which helps and does not want birth control to regulate.    Patient is complaining of pain with menses that start 2 days prior to the start of menses and and last 3 days after. Pain is 8/10 in severity, localized to the lower abdomen both in LLQ and RLQ. Pain is associated with nausea, vomiting, and diarrhea. Patient had her first menses at the age of 16 and since then it always has been painful, never sought medical advise. Patient took some over the counter medication in the past which did not help. Patient also had a patch which caused severe bleeding for 3 consecutive months and removed it after 3 months. Patient is also c/o vaginal dryness and dyspareunia. Has not been sexually active for 8 months, and never had STDs.        Problem, Medication and Allergy Lists were reviewed and are current.  Patient is an established patient of this clinic.         Review of Systems:   Review of Systems   Constitutional: Positive for activity change.   Gastrointestinal: Positive for abdominal pain, diarrhea, nausea and vomiting.   Genitourinary: Positive for dyspareunia, menstrual problem, pelvic pain, vaginal bleeding and vaginal pain.             Physical Exam:   Patient Vitals for the past 24 hrs:   BP Temp Temp src Pulse Resp SpO2 Weight   07/26/17 0806 120/72 97.5  F (36.4  C) Oral 56 16 98 % 136 lb 9.6 oz (62 kg)     Body mass index is 22.05 kg/(m^2).  Vitals were reviewed and were normal  Blood pressure 120/72, pulse 56, temperature 97.5  F (36.4   C), temperature source Oral, resp. rate 16, weight 62 kg (136 lb 9.6 oz), last menstrual period 07/24/2017, SpO2 98 %, not currently breastfeeding.       Physical Exam   Constitutional: She appears well-developed and well-nourished. No distress.   Cardiovascular: Normal rate, regular rhythm and normal heart sounds.    Pulmonary/Chest: Effort normal and breath sounds normal.   Abdominal: Soft. Bowel sounds are normal. She exhibits no distension. There is tenderness (pain with deep palpation R>L side but no masses palpated. ). There is no rebound and no guarding.         Results:      Results from the last 24 hoursNo results found for this or any previous visit (from the past 24 hour(s)).  Assessment and Plan     ## Dysmenorrhea  DDx: premenstrual syndrome vs Endometriosis vs adenomyosis vs leiomyoma   Given the long symptoms of dysmenorrhea associated with the onset of menses makes it likely to be PMS. Patient is also having pain during intercourse with both deep and superficial penetrations which makes Endometriosis also a differential diagnosis. Highly unlikely to be leiomyoma or adenomyosis as there is no pain with physical exam of abdomen and no uterus mass are palpated.     PLAN:   - ibuprofen (ADVIL/MOTRIN) 600 MG tablet; Take 1 tablet (600 mg) by mouth every 6 hours  Dispense: 60 tablet; Refill: 0  Take medication 2 days prior to menses and 3 days after. Take as needed thereafter.   - pyridOXINE (VITAMIN B-6) 50 MG tablet; Take 1 tablet (50 mg) by mouth daily  Dispense: 60 tablet; Refill: 3  - US Pelvis Cmpl wo Transvaginal w Abd/Pel Duplex Lmt; Future  - ketorolac (TORADOL) 30 MG/ML injection; Inject 1 mL (30 mg) into the muscle once for 1 dose  Dispense: 1 mL; Refill: 0  - Will follow up patient in 2 weeks again if the NSAID has worked and will consider OCP if the primary treatment fails. Patient is open to getting OCP at this stage.   There are no discontinued medications.  Options for treatment and  follow-up care were reviewed with the patient. Tiara Andrade  engaged in the decision making process and verbalized understanding of the options discussed and agreed with the final plan.    Reji Byrne MD  Family Medicine PGY-1            Preceptor Attestation:   Patient seen and discussed with the resident. Assessment and plan reviewed with resident and agreed upon.   Supervising Physician:  Manas Bruner MD  [facility]      Again, thank you for allowing me to participate in the care of your patient.      Sincerely,    Reji Byrne MD

## 2017-07-26 NOTE — NURSING NOTE
I administered the following to Tiara Andrade.    MEDICATION: Ketorolac Tromethamine 60MG/2ML (30 mg/mL) (Toradol)  ROUTE: IM  SITE: LUQ - Gluteus  DOSE: 30mL  LOT #: *[id number]  :  Hospira  EXPIRATION DATE:  06012018  NDC#: 4008-8402-98     Was entire vial of medication used? Yes    Name of provider who requested the injection: Dr. Byrne  Name of provider on site Dr. MARA Bruner at the time of performing the injection:     Jenifer Santiago MA

## 2017-07-26 NOTE — LETTER
7/26/2017       RE: Tiara Andrade  [address] [facility] [address]     Dear Colleague,    Thank you for referring your patient, Tiara Andrade, to the [facility] at [facility]. Please see a copy of my visit note below.          HPI:       Tiara Andrade is a 27 year old who presents for the following  Patient presents with:  Abdominal Pain: everytime she has her period she gets cramps/ vomits for 2 days. Has tried midol which helps and does not want birth control to regulate.    Patient is complaining of pain with menses that start 2 days prior to the start of menses and and last 3 days after. Pain is 8/10 in severity, localized to the lower abdomen both in LLQ and RLQ. Pain is associated with nausea, vomiting, and diarrhea. Patient had her first menses at the age of 16 and since then it always has been painful, never sought medical advise. Patient took some over the counter medication in the past which did not help. Patient also had a patch which caused severe bleeding for 3 consecutive months and removed it after 3 months. Patient is also c/o vaginal dryness and dyspareunia. Has not been sexually active for 8 months, and never had STDs.        Problem, Medication and Allergy Lists were reviewed and are current.  Patient is an established patient of this clinic.         Review of Systems:   Review of Systems   Constitutional: Positive for activity change.   Gastrointestinal: Positive for abdominal pain, diarrhea, nausea and vomiting.   Genitourinary: Positive for dyspareunia, menstrual problem, pelvic pain, vaginal bleeding and vaginal pain.             Physical Exam:   Patient Vitals for the past 24 hrs:   BP Temp Temp src Pulse Resp SpO2 Weight   07/26/17 0806 120/72 97.5  F (36.4  C) Oral 56 16 98 % 136 lb 9.6 oz (62 kg)     Body mass index is 22.05 kg/(m^2).  Vitals were reviewed and were normal  Blood pressure 120/72, pulse 56, temperature 97.5  F (36.4   C), temperature source Oral, resp. rate 16, weight 62 kg (136 lb 9.6 oz), last menstrual period 07/24/2017, SpO2 98 %, not currently breastfeeding.       Physical Exam   Constitutional: She appears well-developed and well-nourished. No distress.   Cardiovascular: Normal rate, regular rhythm and normal heart sounds.    Pulmonary/Chest: Effort normal and breath sounds normal.   Abdominal: Soft. Bowel sounds are normal. She exhibits no distension. There is tenderness (pain with deep palpation R>L side but no masses palpated. ). There is no rebound and no guarding.         Results:      Results from the last 24 hoursNo results found for this or any previous visit (from the past 24 hour(s)).  Assessment and Plan     ## Dysmenorrhea  DDx: premenstrual syndrome vs Endometriosis vs adenomyosis vs leiomyoma   Given the long symptoms of dysmenorrhea associated with the onset of menses makes it likely to be PMS. Patient is also having pain during intercourse with both deep and superficial penetrations which makes Endometriosis also a differential diagnosis. Highly unlikely to be leiomyoma or adenomyosis as there is no pain with physical exam of abdomen and no uterus mass are palpated.     PLAN:   - ibuprofen (ADVIL/MOTRIN) 600 MG tablet; Take 1 tablet (600 mg) by mouth every 6 hours  Dispense: 60 tablet; Refill: 0  Take medication 2 days prior to menses and 3 days after. Take as needed thereafter.   - pyridOXINE (VITAMIN B-6) 50 MG tablet; Take 1 tablet (50 mg) by mouth daily  Dispense: 60 tablet; Refill: 3  - US Pelvis Cmpl wo Transvaginal w Abd/Pel Duplex Lmt; Future  - ketorolac (TORADOL) 30 MG/ML injection; Inject 1 mL (30 mg) into the muscle once for 1 dose  Dispense: 1 mL; Refill: 0  - Will follow up patient in 2 weeks again if the NSAID has worked and will consider OCP if the primary treatment fails. Patient is open to getting OCP at this stage.   There are no discontinued medications.  Options for treatment and  follow-up care were reviewed with the patient. Tiara Andrade  engaged in the decision making process and verbalized understanding of the options discussed and agreed with the final plan.    Reji Byrne MD  Family Medicine PGY-1            Preceptor Attestation:   Patient seen and discussed with the resident. Assessment and plan reviewed with resident and agreed upon.   Supervising Physician:  Manas Bruner MD  [facility]      Again, thank you for allowing me to participate in the care of your patient.      Sincerely,    Reji Byrne MD

## 2017-07-26 NOTE — LETTER
"7/26/2017      RE: Tiara Andrade  [address] [facility] [address]             HPI:       Tiara Andrade is a 27 year old who presents for the following  Patient presents with:  Abdominal Pain: everytime she has her period she gets cramps/ vomits for 2 days. Has tried midol which helps and does not want birth control to regulate.    Patient is complaining of pain with menses that start 2 days prior to the start of menses and and last 3 days after. Pain is 8/10 in severity, localized to the lower abdomen both in LLQ and RLQ. Pain is associated with nausea, vomiting, and diarrhea. Patient had her first menses at the age of 16 and since then it always has been painful, never sought medical advise. Patient took some over the counter medication in the past which did not help. Patient also had a patch which caused severe bleeding for 3 consecutive months and removed it after 3 months. Patient is also c/o vaginal dryness and dyspareunia. Has not been sexually active for 8 months, and never had STDs.        Problem, Medication and Allergy Lists were reviewed and are current.  Patient is an established patient of this clinic.         Review of Systems:   Review of Systems   Constitutional: Positive for activity change.   Gastrointestinal: Positive for abdominal pain, diarrhea, nausea and vomiting.   Genitourinary: Positive for dyspareunia, menstrual problem, pelvic pain, vaginal bleeding and vaginal pain.             Physical Exam:   Patient Vitals for the past 24 hrs:   BP Temp Temp src Pulse Resp SpO2 Weight   07/26/17 0806 120/72 97.5  F (36.4  C) Oral 56 16 98 % 136 lb 9.6 oz (62 kg)     Body mass index is 22.05 kg/(m^2).  {[facility] VITALS OPTIONS:644324::\"Vitals were reviewed and were normal\"}     Physical Exam   Constitutional: She appears well-developed and well-nourished. No distress.   Cardiovascular: Normal rate, regular rhythm and normal heart sounds.    Pulmonary/Chest: Effort normal and breath sounds normal. "   Abdominal: Soft. Bowel sounds are normal. She exhibits no distension. There is tenderness (pain with deep palpation R>L side but no masses palpated. ). There is no rebound and no guarding.         Results:      Results from the last 24 hoursNo results found for this or any previous visit (from the past 24 hour(s)).  Assessment and Plan     ## Dysmenorrhea  DDx: premenstrual syndrome vs Endometriosis vs adenomyosis vs leiomyoma   Given the long symptoms of dysmenorrhea associated with the onset of menses makes it likely to be PMS. Patient is also having pain during intercourse with both deep and superficial penetrations which makes Endometriosis also a differential diagnosis. Highly unlikely to be leiomyoma or adenomyosis as there is no pain with physical exam of abdomen and no uterus mass are palpated.     PLAN:   - ibuprofen (ADVIL/MOTRIN) 600 MG tablet; Take 1 tablet (600 mg) by mouth every 6 hours  Dispense: 60 tablet; Refill: 0  Take medication 2 days prior to menses and 3 days after. Take as needed thereafter.   - pyridOXINE (VITAMIN B-6) 50 MG tablet; Take 1 tablet (50 mg) by mouth daily  Dispense: 60 tablet; Refill: 3  - US Pelvis Cmpl wo Transvaginal w Abd/Pel Duplex Lmt; Future  - ketorolac (TORADOL) 30 MG/ML injection; Inject 1 mL (30 mg) into the muscle once for 1 dose  Dispense: 1 mL; Refill: 0  - Will follow up patient in 2 weeks again if the NSAID has worked and will consider OCP if the primary treatment fails. Patient is open to getting OCP at this stage.   There are no discontinued medications.  Options for treatment and follow-up care were reviewed with the patient. Tiara Andrade  engaged in the decision making process and verbalized understanding of the options discussed and agreed with the final plan.    Reji Byrne MD  Family Medicine PGY-1            Preceptor Attestation:   Patient seen and discussed with the resident. Assessment and plan reviewed with resident and agreed upon.   Supervising  Physician:  Manas Bruner MD  [facility]      Reji Byrne MD

## 2017-07-26 NOTE — LETTER
"  7/26/2017      RE: Tiara Andrade  [address] [address]     Dear Colleague,    Thank you for referring your patient, Tiara Andrade, to the [facility]. Please see a copy of my visit note below.          HPI:       Tiara Andrade is a 27 year old who presents for the following  Patient presents with:  Abdominal Pain: everytime she has her period she gets cramps/ vomits for 2 days. Has tried midol which helps and does not want birth control to regulate.    Patient is complaining of pain with menses that start 2 days prior to the start of menses and and last 3 days after. Pain is 8/10 in severity, localized to the lower abdomen both in LLQ and RLQ. Pain is associated with nausea, vomiting, and diarrhea. Patient had her first menses at the age of 16 and since then it always has been painful, never sought medical advise. Patient took some over the counter medication in the past which did not help. Patient also had a patch which caused severe bleeding for 3 consecutive months and removed it after 3 months. Patient is also c/o vaginal dryness and dyspareunia. Has not been sexually active for 8 months, and never had STDs.        Problem, Medication and Allergy Lists were reviewed and are current.  Patient is an established patient of this clinic.         Review of Systems:   Review of Systems   Constitutional: Positive for activity change.   Gastrointestinal: Positive for abdominal pain, diarrhea, nausea and vomiting.   Genitourinary: Positive for dyspareunia, menstrual problem, pelvic pain, vaginal bleeding and vaginal pain.             Physical Exam:   Patient Vitals for the past 24 hrs:   BP Temp Temp src Pulse Resp SpO2 Weight   07/26/17 0806 120/72 97.5  F (36.4  C) Oral 56 16 98 % 136 lb 9.6 oz (62 kg)     Body mass index is 22.05 kg/(m^2).  {[facility] VITALS OPTIONS:710745::\"Vitals were reviewed and were normal\"}     Physical Exam   Constitutional: She appears well-developed and " well-nourished. No distress.   Cardiovascular: Normal rate, regular rhythm and normal heart sounds.    Pulmonary/Chest: Effort normal and breath sounds normal.   Abdominal: Soft. Bowel sounds are normal. She exhibits no distension. There is tenderness (pain with deep palpation R>L side but no masses palpated. ). There is no rebound and no guarding.         Results:      Results from the last 24 hoursNo results found for this or any previous visit (from the past 24 hour(s)).  Assessment and Plan     ## Dysmenorrhea  DDx: premenstrual syndrome vs Endometriosis vs adenomyosis vs leiomyoma   Given the long symptoms of dysmenorrhea associated with the onset of menses makes it likely to be PMS. Patient is also having pain during intercourse with both deep and superficial penetrations which makes Endometriosis also a differential diagnosis. Highly unlikely to be leiomyoma or adenomyosis as there is no pain with physical exam of abdomen and no uterus mass are palpated.     PLAN:   - ibuprofen (ADVIL/MOTRIN) 600 MG tablet; Take 1 tablet (600 mg) by mouth every 6 hours  Dispense: 60 tablet; Refill: 0  Take medication 2 days prior to menses and 3 days after. Take as needed thereafter.   - pyridOXINE (VITAMIN B-6) 50 MG tablet; Take 1 tablet (50 mg) by mouth daily  Dispense: 60 tablet; Refill: 3  - US Pelvis Cmpl wo Transvaginal w Abd/Pel Duplex Lmt; Future  - ketorolac (TORADOL) 30 MG/ML injection; Inject 1 mL (30 mg) into the muscle once for 1 dose  Dispense: 1 mL; Refill: 0  - Will follow up patient in 2 weeks again if the NSAID has worked and will consider OCP if the primary treatment fails. Patient is open to getting OCP at this stage.   There are no discontinued medications.  Options for treatment and follow-up care were reviewed with the patient. Tiara Andrade  engaged in the decision making process and verbalized understanding of the options discussed and agreed with the final plan.    Reji Byrne MD  Family Medicine  PGY-1            Preceptor Attestation:   Patient seen and discussed with the resident. Assessment and plan reviewed with resident and agreed upon.   Supervising Physician:  Manas Bruner MD  [facility]

## 2017-07-26 NOTE — PATIENT INSTRUCTIONS
Here is the plan from today's visit    1. Dysmenorrhea  Take medications 2 days prior to starting your menses and 3 days after. Take it as needed thereafter.   Come back for a follow up if the pain does not subside with the ibuprofen  - will call you once the results for ultrasound are back.  - ibuprofen (ADVIL/MOTRIN) 600 MG tablet; Take 1 tablet (600 mg) by mouth every 6 hours  Dispense: 60 tablet; Refill: 0  - pyridOXINE (VITAMIN B-6) 50 MG tablet; Take 1 tablet (50 mg) by mouth daily  Dispense: 60 tablet; Refill: 3  - US Pelvis Cmpl wo Transvaginal w Abd/Pel Duplex Lmt; Future      Please call or return to clinic if your symptoms don't go away.    Follow up plan  Please make a clinic appointment for follow up with me (LINDA WISEMAN) in 2  weeks for follow up.    Thank you for coming to [facility] today.  Lab Testing:  **If you had lab testing today and your results are reassuring or normal they will be mailed to you or sent through [employer] within 7 days.   **If the lab tests need quick action we will call you with the results.  The phone number we will call with results is # [phone] (home) . If this is not the best number please call our clinic and change the number.  Medication Refills:  If you need any refills please call your pharmacy and they will contact us.   If you need to  your refill at a new pharmacy, please contact the new pharmacy directly. The new pharmacy will help you get your medications transferred faster.   Scheduling:  If you have any concerns about today's visit or wish to schedule another appointment please call our office during normal business hours [phone] (8-5:00 M-F)  If a referral was made to a [facility] and you don't get a call from central scheduling please call [phone].  If a Mammogram was ordered for you at [facility] call [phone] to schedule or change your appointment.  If you had an XRay/CT/Ultrasound/MRI ordered the  number is [phone] to schedule or change your radiology appointment.   Medical Concerns:  If you have urgent medical concerns please call [phone] at any time of the day.

## 2017-07-26 NOTE — Clinical Note
Again look this note, is missing something. I can not close because has unfinished variables.  Thanks  Wojciech

## 2017-07-26 NOTE — LETTER
"7/26/2017      RE: Tiara Andrade  [address] [address]             HPI:       Tiara Andrade is a 27 year old who presents for the following  Patient presents with:  Abdominal Pain: everytime she has her period she gets cramps/ vomits for 2 days. Has tried midol which helps and does not want birth control to regulate.    Patient is complaining of pain with menses that start 2 days prior to the start of menses and and last 3 days after. Pain is 8/10 in severity, localized to the lower abdomen both in LLQ and RLQ. Pain is associated with nausea, vomiting, and diarrhea. Patient had her first menses at the age of 16 and since then it always has been painful, never sought medical advise. Patient took some over the counter medication in the past which did not help. Patient also had a patch which caused severe bleeding for 3 consecutive months and removed it after 3 months. Patient is also c/o vaginal dryness and dyspareunia. Has not been sexually active for 8 months, and never had STDs.        Problem, Medication and Allergy Lists were reviewed and are current.  Patient is an established patient of this clinic.         Review of Systems:   Review of Systems   Constitutional: Positive for activity change.   Gastrointestinal: Positive for abdominal pain, diarrhea, nausea and vomiting.   Genitourinary: Positive for dyspareunia, menstrual problem, pelvic pain, vaginal bleeding and vaginal pain.             Physical Exam:   Patient Vitals for the past 24 hrs:   BP Temp Temp src Pulse Resp SpO2 Weight   07/26/17 0806 120/72 97.5  F (36.4  C) Oral 56 16 98 % 136 lb 9.6 oz (62 kg)     Body mass index is 22.05 kg/(m^2).  {[facility] VITALS OPTIONS:929870::\"Vitals were reviewed and were normal\"}     Physical Exam   Constitutional: She appears well-developed and well-nourished. No distress.   Cardiovascular: Normal rate, regular rhythm and normal heart sounds.    Pulmonary/Chest: Effort normal and breath sounds normal. "   Abdominal: Soft. Bowel sounds are normal. She exhibits no distension. There is tenderness (pain with deep palpation R>L side but no masses palpated. ). There is no rebound and no guarding.         Results:      Results from the last 24 hoursNo results found for this or any previous visit (from the past 24 hour(s)).  Assessment and Plan     ## Dysmenorrhea  DDx: premenstrual syndrome vs Endometriosis vs adenomyosis vs leiomyoma   Given the long symptoms of dysmenorrhea associated with the onset of menses makes it likely to be PMS. Patient is also having pain during intercourse with both deep and superficial penetrations which makes Endometriosis also a differential diagnosis. Highly unlikely to be leiomyoma or adenomyosis as there is no pain with physical exam of abdomen and no uterus mass are palpated.     PLAN:   - ibuprofen (ADVIL/MOTRIN) 600 MG tablet; Take 1 tablet (600 mg) by mouth every 6 hours  Dispense: 60 tablet; Refill: 0  Take medication 2 days prior to menses and 3 days after. Take as needed thereafter.   - pyridOXINE (VITAMIN B-6) 50 MG tablet; Take 1 tablet (50 mg) by mouth daily  Dispense: 60 tablet; Refill: 3  - US Pelvis Cmpl wo Transvaginal w Abd/Pel Duplex Lmt; Future  - ketorolac (TORADOL) 30 MG/ML injection; Inject 1 mL (30 mg) into the muscle once for 1 dose  Dispense: 1 mL; Refill: 0  - Will follow up patient in 2 weeks again if the NSAID has worked and will consider OCP if the primary treatment fails. Patient is open to getting OCP at this stage.   There are no discontinued medications.  Options for treatment and follow-up care were reviewed with the patient. Tiara Andrade  engaged in the decision making process and verbalized understanding of the options discussed and agreed with the final plan.    Reji Byrne MD  Family Medicine PGY-1            Preceptor Attestation:   Patient seen and discussed with the resident. Assessment and plan reviewed with resident and agreed upon.   Supervising  Physician:  Manas Bruner MD  [facility]      Rjei Byrne MD

## 2017-07-26 NOTE — PROGRESS NOTES
Preceptor Attestation:   Patient seen and discussed with the resident. Assessment and plan reviewed with resident and agreed upon.   Supervising Physician:  Manas Bruner MD  [facility]

## 2017-07-26 NOTE — MR AVS SNAPSHOT
After Visit Summary   7/26/2017    Tiara Andrade    MRN: [id number]           Patient Information     Date Of Birth          1990        Visit Information        Provider Department      7/26/2017 8:00 AM Linda Byrne MD [facility]        Today's Diagnoses     Dysmenorrhea    -  1      Care Instructions    Here is the plan from today's visit    1. Dysmenorrhea  Take medications 2 days prior to starting your menses and 3 days after. Take it as needed thereafter.   Come back for a follow up if the pain does not subside with the ibuprofen  - will call you once the results for ultrasound are back.  - ibuprofen (ADVIL/MOTRIN) 600 MG tablet; Take 1 tablet (600 mg) by mouth every 6 hours  Dispense: 60 tablet; Refill: 0  - pyridOXINE (VITAMIN B-6) 50 MG tablet; Take 1 tablet (50 mg) by mouth daily  Dispense: 60 tablet; Refill: 3  - US Pelvis Cmpl wo Transvaginal w Abd/Pel Duplex Lmt; Future      Please call or return to clinic if your symptoms don't go away.    Follow up plan  Please make a clinic appointment for follow up with me (LINDA BYRNE) in 2  weeks for follow up.    Thank you for coming to [facility] today.  Lab Testing:  **If you had lab testing today and your results are reassuring or normal they will be mailed to you or sent through [employer] within 7 days.   **If the lab tests need quick action we will call you with the results.  The phone number we will call with results is # [phone] (home) . If this is not the best number please call our clinic and change the number.  Medication Refills:  If you need any refills please call your pharmacy and they will contact us.   If you need to  your refill at a new pharmacy, please contact the new pharmacy directly. The new pharmacy will help you get your medications transferred faster.   Scheduling:  If you have any concerns about today's visit or wish to schedule another appointment please call our office during  normal business hours [phone] (8-5:00 M-F)  If a referral was made to a [facility] and you don't get a call from central scheduling please call [phone].  If a Mammogram was ordered for you at [facility] call [phone] to schedule or change your appointment.  If you had an XRay/CT/Ultrasound/MRI ordered the number is [phone] to schedule or change your radiology appointment.   Medical Concerns:  If you have urgent medical concerns please call [phone] at any time of the day.            Follow-ups after your visit        Future tests that were ordered for you today     Open Future Orders        Priority Expected Expires Ordered    US Pelvis Cmpl wo Transvaginal w Abd/Pel Duplex Lmt Routine  2018            Who to contact     Please call your clinic at [phone] to:    Ask questions about your health    Make or cancel appointments    Discuss your medicines    Learn about your test results    Speak to your doctor   If you have compliments or concerns about an experience at your clinic, or if you wish to file a complaint, please contact [facility] Patient Relations at [phone] or email us at [email or web address]         Additional Information About Your Visit        [employer] Information     [employer] is an electronic gateway that provides easy, online access to your medical records. With [employer], you can request a clinic appointment, read your test results, renew a prescription or communicate with your care team.     To sign up for [employer] visit the website at [email or web address]   You will be asked to enter the access code listed below, as well as some personal information. Please follow the directions to create your username and password.     Your access code is: [email or web address]  Expires: 2017  6:58 PM     Your access code will  in 90 days. If you need help or a new code, please contact your  [facility] or call [phone] for assistance.        Care EveryWhere ID     This is your Care EveryWhere ID. This could be used by other organizations to access your [facility] medical records  [id number]        Your Vitals Were     Pulse Temperature Respirations Last Period Pulse Oximetry BMI (Body Mass Index)    56 97.5  F (36.4  C) (Oral) 16 07/24/2017 (Exact Date) 98% 22.05 kg/m2       Blood Pressure from Last 3 Encounters:   07/26/17 120/72   07/14/17 98/65   06/27/17 104/67    Weight from Last 3 Encounters:   07/26/17 136 lb 9.6 oz (62 kg)   07/14/17 136 lb 6.4 oz (61.9 kg)   06/27/17 136 lb (61.7 kg)                 Today's Medication Changes          These changes are accurate as of: 7/26/17  9:03 AM.  If you have any questions, ask your nurse or doctor.               Start taking these medicines.        Dose/Directions    ibuprofen 600 MG tablet   Commonly known as:  ADVIL/MOTRIN   Used for:  Dysmenorrhea   Started by:  Reji Byrne MD        Dose:  600 mg   Take 1 tablet (600 mg) by mouth every 6 hours   Quantity:  60 tablet   Refills:  0       ketorolac 30 MG/ML injection   Commonly known as:  TORADOL   Used for:  Dysmenorrhea   Started by:  Reji Byrne MD        Dose:  30 mg   Inject 1 mL (30 mg) into the muscle once for 1 dose   Quantity:  1 mL   Refills:  0       pyridOXINE 50 MG tablet   Commonly known as:  VITAMIN B-6   Used for:  Dysmenorrhea   Started by:  Reji Byrne MD        Dose:  50 mg   Take 1 tablet (50 mg) by mouth daily   Quantity:  60 tablet   Refills:  3            Where to get your medicines      These medications were sent to [facility] [place], [place] - [address] [facility] [address]     Phone:  [phone]     ibuprofen 600 MG tablet    pyridOXINE 50 MG tablet         Some of these will need a paper prescription and others can be bought over the counter.  Ask your nurse if you have questions.     You  don't need a prescription for these medications     ketorolac 30 MG/ML injection                Primary Care Provider Office Phone # Fax #    Nona Cullen [phone]       [facility] [address] [facility] [address]        Equal Access to Services     BRENNAJEAN-PAUL JUAN : Hadii keanu watson hadcamrono Soomaali, waaxda luqadaha, qaybta kaalmada adeegyada, waxgenia clementein hayaan adedani zhao lizet bonilla. So [facility] [phone].    ATENCIÓN: Si habla español, tiene a whyte disposición servicios gratuitos de asistencia lingüística. Llame al [phone].    We comply with applicable federal civil rights laws and [place] laws. We do not discriminate on the basis of race, color, national origin, age, disability sex, sexual orientation or gender identity.            Thank you!     Thank you for choosing [facility]  for your care. Our goal is always to provide you with excellent care. Hearing back from our patients is one way we can continue to improve our services. Please take a few minutes to complete the written survey that you may receive in the mail after your visit with us. Thank you!             Your Updated Medication List - Protect others around you: Learn how to safely use, store and throw away your medicines at www.disposemymeds.org.          This list is accurate as of: 7/26/17  9:03 AM.  Always use your most recent med list.                   Brand Name Dispense Instructions for use Diagnosis    hydrOXYzine 25 MG tablet    ATARAX    60 tablet    Take 1 tablet (25 mg) by mouth every 6 hours as needed for anxiety    JASON (generalized anxiety disorder)       hydrOXYzine 50 MG capsule    VISTARIL    30 capsule    Take 1 capsule (50 mg) by mouth 3 times daily as needed for other (congestion)        ibuprofen 600 MG tablet    ADVIL/MOTRIN    60 tablet    Take 1 tablet (600 mg) by mouth every 6 hours    Dysmenorrhea       ketorolac 30 MG/ML injection    TORADOL    1 mL    Inject 1 mL (30  mg) into the muscle once for 1 dose    Dysmenorrhea       pyridOXINE 50 MG tablet    VITAMIN B-6    60 tablet    Take 1 tablet (50 mg) by mouth daily    Dysmenorrhea

## 2017-07-26 NOTE — LETTER
[facility] No Show - Return 1st           Dear Tiara,    We missed you at your recently scheduled appointment at [facility]. We have several options to help you reschedule your appointment:      Call [phone]    Visit our website at [email or web address] and click Request an Appointment    Visit your [employer] account at [employer] and request an appointment    We are committed to providing you with exceptional care and service. It s important that our patients can get appointments when they need them, so we ask that patients call us prior to their appointment time to cancel if they are unable to make it.    Please note that if you miss five appointments within 12 months without providing notice, we may discontinue our services to you. If there is anything we can do to help you keep your scheduled appointment, please let us know. Our patient representatives can be reached at [phone].    Sincerely,    [facility]

## 2017-07-26 NOTE — LETTER
"  7/26/2017      RE: Tiara Andrade  [address] [facility] [facility] [address]             HPI:       Tiara Andrade is a 27 year old who presents for the following  Patient presents with:  Abdominal Pain: everytime she has her period she gets cramps/ vomits for 2 days. Has tried midol which helps and does not want birth control to regulate.    Patient is complaining of pain with menses that start 2 days prior to the start of menses and and last 3 days after. Pain is 8/10 in severity, localized to the lower abdomen both in LLQ and RLQ. Pain is associated with nausea, vomiting, and diarrhea. Patient had her first menses at the age of 16 and since then it always has been painful, never sought medical advise. Patient took some over the counter medication in the past which did not help. Patient also had a patch which caused severe bleeding for 3 consecutive months and removed it after 3 months. Patient is also c/o vaginal dryness and dyspareunia. Has not been sexually active for 8 months, and never had STDs.        Problem, Medication and Allergy Lists were reviewed and are current.  Patient is an established patient of this clinic.         Review of Systems:   Review of Systems   Constitutional: Positive for activity change.   Gastrointestinal: Positive for abdominal pain, diarrhea, nausea and vomiting.   Genitourinary: Positive for dyspareunia, menstrual problem, pelvic pain, vaginal bleeding and vaginal pain.             Physical Exam:   Patient Vitals for the past 24 hrs:   BP Temp Temp src Pulse Resp SpO2 Weight   07/26/17 0806 120/72 97.5  F (36.4  C) Oral 56 16 98 % 136 lb 9.6 oz (62 kg)     Body mass index is 22.05 kg/(m^2).  {[facility] VITALS OPTIONS:125283::\"Vitals were reviewed and were normal\"}     Physical Exam   Constitutional: She appears well-developed and well-nourished. No distress.   Cardiovascular: Normal rate, regular rhythm and normal heart sounds.    Pulmonary/Chest: Effort normal and breath sounds normal. "   Abdominal: Soft. Bowel sounds are normal. She exhibits no distension. There is tenderness (pain with deep palpation R>L side but no masses palpated. ). There is no rebound and no guarding.         Results:      Results from the last 24 hoursNo results found for this or any previous visit (from the past 24 hour(s)).  Assessment and Plan     ## Dysmenorrhea  DDx: premenstrual syndrome vs Endometriosis vs adenomyosis vs leiomyoma   Given the long symptoms of dysmenorrhea associated with the onset of menses makes it likely to be PMS. Patient is also having pain during intercourse with both deep and superficial penetrations which makes Endometriosis also a differential diagnosis. Highly unlikely to be leiomyoma or adenomyosis as there is no pain with physical exam of abdomen and no uterus mass are palpated.     PLAN:   - ibuprofen (ADVIL/MOTRIN) 600 MG tablet; Take 1 tablet (600 mg) by mouth every 6 hours  Dispense: 60 tablet; Refill: 0  Take medication 2 days prior to menses and 3 days after. Take as needed thereafter.   - pyridOXINE (VITAMIN B-6) 50 MG tablet; Take 1 tablet (50 mg) by mouth daily  Dispense: 60 tablet; Refill: 3  - US Pelvis Cmpl wo Transvaginal w Abd/Pel Duplex Lmt; Future  - ketorolac (TORADOL) 30 MG/ML injection; Inject 1 mL (30 mg) into the muscle once for 1 dose  Dispense: 1 mL; Refill: 0  - Will follow up patient in 2 weeks again if the NSAID has worked and will consider OCP if the primary treatment fails. Patient is open to getting OCP at this stage.   There are no discontinued medications.  Options for treatment and follow-up care were reviewed with the patient. Tiara Andrade  engaged in the decision making process and verbalized understanding of the options discussed and agreed with the final plan.    Reji Byrne MD  Family Medicine PGY-1            Preceptor Attestation:   Patient seen and discussed with the resident. Assessment and plan reviewed with resident and agreed upon.   Supervising  Physician:  Manas Bruner MD  [facility]        Reji Byrne MD

## 2017-07-31 RX ORDER — KETOROLAC TROMETHAMINE 30 MG/ML
30 INJECTION, SOLUTION INTRAMUSCULAR; INTRAVENOUS ONCE
Qty: 30 ML | Refills: 0 | OUTPATIENT
Start: 2017-07-31 | End: 2017-07-31

## 2017-08-25 ENCOUNTER — TELEPHONE (OUTPATIENT)
Dept: FAMILY MEDICINE | Facility: CLINIC | Age: 27
End: 2017-08-25

## 2017-08-25 NOTE — TELEPHONE ENCOUNTER
[facility] phone call message- patient requesting to speak with PCP or provider:    PCP: Nona Cullen    Additional Comments: Patient requesting letter to leave work today due to anxiety.  States employer is having them work overtime.    Is a call back needed? Yes    Patient informed that it may take up to 2 business days to hear back from PCP:Yes    OK to leave a message on voice mail? Yes    Primary language: English      needed? No    Call taken on August 25, 2017 at 3:05 PM by Nneka Dias

## 2017-11-02 ENCOUNTER — TELEPHONE (OUTPATIENT)
Dept: FAMILY MEDICINE | Facility: CLINIC | Age: 27
End: 2017-11-02

## 2017-11-02 NOTE — TELEPHONE ENCOUNTER
"Patient has no showed 2 scheduled appointments. Please use following script to explain no show policy to patient:    \"We see that you have missed some of your recently scheduled appointments. At [facility] we have a new no show policy, where if you miss too many appointments within 1 year, we may not be able to continue to schedule you. If you are unable to make your scheduled appointments, please call the clinic to cancel prior to your appointment time.\"  "

## 2018-01-09 ENCOUNTER — OFFICE VISIT (OUTPATIENT)
Dept: FAMILY MEDICINE | Facility: CLINIC | Age: 28
End: 2018-01-09
Payer: COMMERCIAL

## 2018-01-09 VITALS
BODY MASS INDEX: 20.63 KG/M2 | DIASTOLIC BLOOD PRESSURE: 65 MMHG | TEMPERATURE: 98.1 F | OXYGEN SATURATION: 97 % | WEIGHT: 128.4 LBS | SYSTOLIC BLOOD PRESSURE: 105 MMHG | HEART RATE: 68 BPM | HEIGHT: 66 IN | RESPIRATION RATE: 20 BRPM

## 2018-01-09 DIAGNOSIS — B37.31 CANDIDIASIS OF VULVA AND VAGINA: ICD-10-CM

## 2018-01-09 DIAGNOSIS — N94.10 DYSPAREUNIA IN FEMALE: ICD-10-CM

## 2018-01-09 DIAGNOSIS — Z30.011 ENCOUNTER FOR INITIAL PRESCRIPTION OF CONTRACEPTIVE PILLS: ICD-10-CM

## 2018-01-09 DIAGNOSIS — F41.1 GAD (GENERALIZED ANXIETY DISORDER): Primary | ICD-10-CM

## 2018-01-09 DIAGNOSIS — N94.6 DYSMENORRHEA: ICD-10-CM

## 2018-01-09 LAB
BACTERIA: NORMAL
CLUE CELLS: NORMAL
MOTILE TRICHOMONAS: NEGATIVE
ODOR: NORMAL
PH WET PREP: <4.5
WBC WET PREP: NORMAL
YEAST: PRESENT

## 2018-01-09 RX ORDER — HYDROXYZINE HYDROCHLORIDE 25 MG/1
25 TABLET, FILM COATED ORAL EVERY 6 HOURS PRN
Qty: 30 TABLET | Refills: 11 | Status: SHIPPED | OUTPATIENT
Start: 2018-01-09 | End: 2018-03-09

## 2018-01-09 RX ORDER — PROPRANOLOL HYDROCHLORIDE 40 MG/1
40 TABLET ORAL DAILY
Qty: 30 TABLET | Refills: 3 | Status: SHIPPED | OUTPATIENT
Start: 2018-01-09 | End: 2018-11-20

## 2018-01-09 RX ORDER — FLUCONAZOLE 150 MG/1
150 TABLET ORAL ONCE
Qty: 1 TABLET | Refills: 0 | Status: SHIPPED | OUTPATIENT
Start: 2018-01-09 | End: 2018-01-09

## 2018-01-09 RX ORDER — NORGESTIMATE AND ETHINYL ESTRADIOL 0.25-0.035
1 KIT ORAL DAILY
Qty: 112 TABLET | Refills: 3 | Status: SHIPPED | OUTPATIENT
Start: 2018-01-09 | End: 2018-03-09

## 2018-01-09 ASSESSMENT — ANXIETY QUESTIONNAIRES
5. BEING SO RESTLESS THAT IT IS HARD TO SIT STILL: MORE THAN HALF THE DAYS
IF YOU CHECKED OFF ANY PROBLEMS ON THIS QUESTIONNAIRE, HOW DIFFICULT HAVE THESE PROBLEMS MADE IT FOR YOU TO DO YOUR WORK, TAKE CARE OF THINGS AT HOME, OR GET ALONG WITH OTHER PEOPLE: SOMEWHAT DIFFICULT
6. BECOMING EASILY ANNOYED OR IRRITABLE: MORE THAN HALF THE DAYS
7. FEELING AFRAID AS IF SOMETHING AWFUL MIGHT HAPPEN: NOT AT ALL
GAD7 TOTAL SCORE: 11
1. FEELING NERVOUS, ANXIOUS, OR ON EDGE: NEARLY EVERY DAY
3. WORRYING TOO MUCH ABOUT DIFFERENT THINGS: NOT AT ALL
2. NOT BEING ABLE TO STOP OR CONTROL WORRYING: SEVERAL DAYS

## 2018-01-09 ASSESSMENT — PATIENT HEALTH QUESTIONNAIRE - PHQ9: 5. POOR APPETITE OR OVEREATING: NEARLY EVERY DAY

## 2018-01-09 NOTE — PATIENT INSTRUCTIONS
Schedule for behavioral health visit with Dr. Kimbrough (see telephone note from 6/2/817)    Here is the plan from today's visit    1. JASON (generalized anxiety disorder)  - propranolol (INDERAL) 40 MG tablet; Take 1 tablet (40 mg) by mouth daily 60-90 minutes prior to stress-inducing event  Dispense: 30 tablet; Refill: 3    2. Dyspareunia in female  3. Dysmenorrhea  4. Encounter for initial prescription of contraceptive pills  Using birth control pills diagnostically, if symptoms improve significantly, likely you have endometriosis.   - schedule pelvic ultrasound [phone]  - Wet Prep ([facility])  - NEISSERIA GONORRHOEA PCR  - CHLAMYDIA TRACHOMATIS PCR  - norgestimate-ethinyl estradiol (ORTHO-CYCLEN, SPRINTEC) 0.25-35 MG-MCG per tablet; Take 1 tablet by mouth daily Take hormone pills only, skip last week of each pack until 3rd pack, then take full pack.  Dispense: 112 tablet; Refill: 3    5. Candidiasis of vulva and vagina (yeast infection)  -     fluconazole (DIFLUCAN) 150 MG tablet; Take 1 tablet (150 mg) by mouth once for 1 dose    Please call or return to clinic if your symptoms don't go away.    Follow up plan  Please make a clinic appointment for follow up with me (KAYA STAUFFER) in 1-2  months for anxiety and pelvic pain follow up.    Thank you for coming to [facility] today.  Lab Testing:  **If you had lab testing today and your results are reassuring or normal they will be mailed to you or sent through [employer] within 7 days.   **If the lab tests need quick action we will call you with the results.  The phone number we will call with results is # [phone] (home) . If this is not the best number please call our clinic and change the number.  Medication Refills:  If you need any refills please call your pharmacy and they will contact us.   If you need to  your refill at a new pharmacy, please contact the new pharmacy directly. The new pharmacy will help you get your medications  transferred faster.   Scheduling:  If you have any concerns about today's visit or wish to schedule another appointment please call our office during normal business hours [phone] (8-5:00 M-F)  If a referral was made to a [facility] and you don't get a call from central scheduling please call [phone].  If a Mammogram was ordered for you at [facility] call [phone] to schedule or change your appointment.  If you had an XRay/CT/Ultrasound/MRI ordered the number is [phone] to schedule or change your radiology appointment.   Medical Concerns:  If you have urgent medical concerns please call [phone] at any time of the day.

## 2018-01-09 NOTE — MR AVS SNAPSHOT
After Visit Summary   1/9/2018    Tiara Andrade    MRN: [id number]           Patient Information     Date Of Birth          1990        Visit Information        Provider Department      1/9/2018 10:00 AM Nona Stauffer MD \[facility]\"" [facility]        Today's Diagnoses     JASON (generalized anxiety disorder)    -  1    Candidiasis of vulva and vagina        Dyspareunia in female        Dysmenorrhea        Encounter for initial prescription of contraceptive pills          Care Instructions    Schedule for behavioral health visit with Dr. Kimbrough (see telephone note from 6/2/817)    Here is the plan from today's visit    1. JASON (generalized anxiety disorder)  - propranolol (INDERAL) 40 MG tablet; Take 1 tablet (40 mg) by mouth daily 60-90 minutes prior to stress-inducing event  Dispense: 30 tablet; Refill: 3    2. Dyspareunia in female  3. Dysmenorrhea  4. Encounter for initial prescription of contraceptive pills  Using birth control pills diagnostically, if symptoms improve significantly, likely you have endometriosis.   - schedule pelvic ultrasound [phone]  - Wet Prep ([facility])  - NEISSERIA GONORRHOEA PCR  - CHLAMYDIA TRACHOMATIS PCR  - norgestimate-ethinyl estradiol (ORTHO-CYCLEN, SPRINTEC) 0.25-35 MG-MCG per tablet; Take 1 tablet by mouth daily Take hormone pills only, skip last week of each pack until 3rd pack, then take full pack.  Dispense: 112 tablet; Refill: 3    5. Candidiasis of vulva and vagina (yeast infection)  -     fluconazole (DIFLUCAN) 150 MG tablet; Take 1 tablet (150 mg) by mouth once for 1 dose    Please call or return to clinic if your symptoms don't go away.    Follow up plan  Please make a clinic appointment for follow up with me (NONA STAUFFER) in 1-2  months for anxiety and pelvic pain follow up.    Thank you for coming to [facility] today.  Lab Testing:  **If you had lab testing today and your results are reassuring or normal they will be  mailed to you or sent through [employer] within 7 days.   **If the lab tests need quick action we will call you with the results.  The phone number we will call with results is # [phone] (home) . If this is not the best number please call our clinic and change the number.  Medication Refills:  If you need any refills please call your pharmacy and they will contact us.   If you need to  your refill at a new pharmacy, please contact the new pharmacy directly. The new pharmacy will help you get your medications transferred faster.   Scheduling:  If you have any concerns about today's visit or wish to schedule another appointment please call our office during normal business hours [phone] (8-5:00 M-F)  If a referral was made to a [facility] and you don't get a call from central scheduling please call [phone].  If a Mammogram was ordered for you at [facility] call [phone] to schedule or change your appointment.  If you had an XRay/CT/Ultrasound/MRI ordered the number is [phone] to schedule or change your radiology appointment.   Medical Concerns:  If you have urgent medical concerns please call [phone] at any time of the day.            Follow-ups after your visit        Your next 10 appointments already scheduled     Jan 16, 2018  9:40 AM CST   PHYSICAL with Nona Cullen MD   [facility] ([facility])    [address] [place] [address],  [address]  [place] [address]   [phone]              Who to contact     Please call your clinic at [phone] to:    Ask questions about your health    Make or cancel appointments    Discuss your medicines    Learn about your test results    Speak to your doctor   If you have compliments or concerns about an experience at your clinic, or if you wish to file a complaint, please contact [facility] Patient Relations at [phone] or email us at  [email or web address]         Additional Information About Your Visit        [employer] Information     [employer] is an electronic gateway that provides easy, online access to your medical records. With [employer], you can request a clinic appointment, read your test results, renew a prescription or communicate with your care team.     To sign up for [employer] visit the website at [email or web address]   You will be asked to enter the access code listed below, as well as some personal information. Please follow the directions to create your username and password.     Your access code is: [email or web address]  Expires: 2018 11:02 AM     Your access code will  in 90 days. If you need help or a new code, please contact your [facility] or call [phone] for assistance.        Care EveryWhere ID     This is your Care EveryWhere ID. This could be used by other organizations to access your [facility] medical records  [id number]        Your Vitals Were     Pulse Temperature Respirations Height Last Period Pulse Oximetry    68 98.1  F (36.7  C) (Oral) 20 5' 6\" (167.6 cm) 2017 97%    Breastfeeding? BMI (Body Mass Index)                No 20.72 kg/m2           Blood Pressure from Last 3 Encounters:   18 105/65   17 120/72   17 98/65    Weight from Last 3 Encounters:   18 128 lb 6.4 oz (58.2 kg)   17 136 lb 9.6 oz (62 kg)   17 136 lb 6.4 oz (61.9 kg)              We Performed the Following     CHLAMYDIA TRACHOMATIS PCR     NEISSERIA GONORRHOEA PCR     Wet Prep ([facility])          Today's Medication Changes          These changes are accurate as of: 18 11:02 AM.  If you have any questions, ask your nurse or doctor.               Start taking these medicines.        Dose/Directions    fluconazole 150 MG tablet   Commonly known as:  DIFLUCAN   Used for:  Candidiasis of vulva and vagina   Started by:  Nona Cullen MD        Dose:  150 " mg   Take 1 tablet (150 mg) by mouth once for 1 dose   Quantity:  1 tablet   Refills:  0       norgestimate-ethinyl estradiol 0.25-35 MG-MCG per tablet   Commonly known as:  ORTHO-CYCLEN, SPRINTEC   Used for:  Dyspareunia in female, Dysmenorrhea   Started by:  Nona Cullen MD        Dose:  1 tablet   Take 1 tablet by mouth daily Take hormone pills only, skip last week of each pack until 3rd pack, then take full pack.   Quantity:  112 tablet   Refills:  3       propranolol 40 MG tablet   Commonly known as:  INDERAL   Used for:  JASON (generalized anxiety disorder)   Started by:  Nona Cullen MD        Dose:  40 mg   Take 1 tablet (40 mg) by mouth daily 60-90 minutes prior to stress-inducing event   Quantity:  30 tablet   Refills:  3         Stop taking these medicines if you haven't already. Please contact your care team if you have questions.     hydrOXYzine 50 MG capsule   Commonly known as:  VISTARIL   Stopped by:  Nona Cullen MD                Where to get your medicines      These medications were sent to [facility]/pharmacy #[id number] - [place], [place] - [address], [place] [address]     Phone:  [phone]     fluconazole 150 MG tablet    hydrOXYzine 25 MG tablet    norgestimate-ethinyl estradiol 0.25-35 MG-MCG per tablet    propranolol 40 MG tablet                Primary Care Provider Office Phone # Fax #    Nona Cullen [phone]       [address] [facility] [address]        Equal Access to Services     ABRAHAM MARTINEZ AH: Hadii keanu millero Socarmen, waaxda luqadaha, qaybta kaalmada ira, waxay frandy hinds . So [facility] [phone].    ATENCIÓN: Si habla alfredo, tiene a whyte disposición servicios gratuitos de asistencia lingüística. Abida al [phone].    We comply with applicable federal civil rights laws and [place] laws. We do not discriminate on the basis of race, color, national origin, age,  disability, sex, sexual orientation, or gender identity.            Thank you!     Thank you for choosing [facility]  for your care. Our goal is always to provide you with excellent care. Hearing back from our patients is one way we can continue to improve our services. Please take a few minutes to complete the written survey that you may receive in the mail after your visit with us. Thank you!             Your Updated Medication List - Protect others around you: Learn how to safely use, store and throw away your medicines at www.disposemymeds.org.          This list is accurate as of: 1/9/18 11:02 AM.  Always use your most recent med list.                   Brand Name Dispense Instructions for use Diagnosis    fluconazole 150 MG tablet    DIFLUCAN    1 tablet    Take 1 tablet (150 mg) by mouth once for 1 dose    Candidiasis of vulva and vagina       hydrOXYzine 25 MG tablet    ATARAX    30 tablet    Take 1 tablet (25 mg) by mouth every 6 hours as needed for anxiety    JASON (generalized anxiety disorder)       ibuprofen 600 MG tablet    ADVIL/MOTRIN    60 tablet    Take 1 tablet (600 mg) by mouth every 6 hours    Dysmenorrhea       norgestimate-ethinyl estradiol 0.25-35 MG-MCG per tablet    ORTHO-CYCLEN, SPRINTEC    112 tablet    Take 1 tablet by mouth daily Take hormone pills only, skip last week of each pack until 3rd pack, then take full pack.    Dyspareunia in female, Dysmenorrhea       propranolol 40 MG tablet    INDERAL    30 tablet    Take 1 tablet (40 mg) by mouth daily 60-90 minutes prior to stress-inducing event    JASON (generalized anxiety disorder)       pyridOXINE 50 MG tablet    VITAMIN B-6    60 tablet    Take 1 tablet (50 mg) by mouth daily    Dysmenorrhea

## 2018-01-09 NOTE — PROGRESS NOTES
HPI:       Tiara Andrade is a 28 year old who presents for the following  Patient presents with:  Vaginal Problem: painful,dry,itchy,cramping during and following intercourse   RECHECK: Anxiety   Refill Request: pyridOXINE (VITAMIN B-6), hydrOXYzine (ATARAX) 25 MG      1.Follow up- Anxiety,  Hydroxyzine previously prescribed, not taken in the past 1-2 months due to loss of insurance. Never took during the day due to long commute, worried about getting too tired.   Didn't get a call from therapy to schedule  Overall going well.   Now feels rested in the morning  Feels nervous/tense worsening throughout the day, shaking.   Previously on propranlol for daytime nervousness  GAD7=11    2. Vaginal- concerns, dry, pain during intercourse  Sharp pain with sex and after for many days at rest and with crossing legs.   Improved slightly with ibuprofen  LMP 12/24  Initial insertion mildly painful, but most of the bad pain is with deeper insertion/thrusting. Pain is deeper (not at the vulva)  Sex with new partner (Ed) since September 2017, pain since the beginning.   Last STD screening since July 2017 (different partner(s) previously)  Previously partners pain with sex intermittent, this is way worse  Worst this past month with associated itching. Normally has itching few days before/after period, but this time has persisted.   Using condoms majority of the time. If it breaks or if he doesn't use condom, she takes Plan B (x3). Using lubrication, not helpful  Interested in female circumcision take-down, wants to discuss at another time.     3. Contraception  Wants birth control pills, but open to other options eventually.  Previously Nexplanon - bled heavily for 4 months  Patient does not get migraines  No family history of VTE  Menses last 5-6 days, bad cramps and vomiting for first two days of c ycle. Regular cycles.     Problem, Medication and Allergy Lists were reviewed and are current.  Patient is an established patient  "of this clinic.         Review of Systems:   Review of Systems          Physical Exam:   Patient Vitals for the past 24 hrs:   BP Temp Temp src Pulse Resp SpO2 Height Weight   01/09/18 1016 105/65 98.1  F (36.7  C) Oral 68 20 97 % 5' 6\" (167.6 cm) 128 lb 6.4 oz (58.2 kg)     Body mass index is 20.72 kg/(m^2).     Physical Exam   Constitutional: She appears well-developed and well-nourished.   HENT:   Head: Normocephalic and atraumatic.   Eyes: Conjunctivae are normal.   Cardiovascular: Normal rate.    Pulmonary/Chest: Effort normal. No respiratory distress.   Genitourinary: Uterus normal. Right adnexum displays no mass, no tenderness and no fullness. Left adnexum displays tenderness. Left adnexum displays no mass and no fullness. Vaginal discharge found.   Genitourinary Comments: Anterior circumcision with skin/scar tissue covering urethral. Resistance with bimanual exam comes from muscle tightness, not from the skin.   Reportedly small amount of increased pain with cervical motion, but no chandelier sign. Used small (white) plastic lighted speculum. Unable to visualize cervix due to patient discomfort. Cervix posterior on two-finger bimanual exam. White clumpy discharge in vagina.    Skin: Skin is warm and dry. No rash noted. No erythema. No pallor.   Psychiatric: She has a normal mood and affect. Her behavior is normal. Judgment and thought content normal.   Vitals reviewed.      Results:      Results from the last 24 hours  Results for orders placed or performed in visit on 01/09/18 (from the past 24 hour(s))   Wet Prep ([facility])   Result Value Ref Range    Yeast Wet Prep Present none    Motile Trichomonas Wet Prep Negative Negative    Clue Cells Wet Prep None NONE    WBC WET PREP 10-25 2 - 5    Bacteria Wet Prep Moderate None    pH Wet Prep <4.5 3.8 - 4.5    Odor Wet Prep None NONE     Assessment and Plan     Here is the plan from today's visit    1. JASON (generalized anxiety disorder)  - propranolol (INDERAL) " 40 MG tablet; Take 1 tablet (40 mg) by mouth daily 60-90 minutes prior to stress-inducing event  Dispense: 30 tablet; Refill: 3  Schedule for behavioral health visit with Dr. Kimbrough (see telephone note from 6/2/817)      2. Dyspareunia in female  Ddx: pain from female circumcision, endometriosis, fibroids  3. Dysmenorrhea  4. Encounter for initial prescription of contraceptive pills  Using birth control pills diagnostically, if pain with sex symptoms improve significantly, likely you have endometriosis.   - schedule pelvic ultrasound [phone]  - Wet Prep ([facility]) - yeast  - NEISSERIA GONORRHOEA PCR  - CHLAMYDIA TRACHOMATIS PCR  - norgestimate-ethinyl estradiol (ORTHO-CYCLEN, SPRINTEC) 0.25-35 MG-MCG per tablet; Take 1 tablet by mouth daily Take hormone pills only, skip last week of each pack until 3rd pack, then take full pack.  Dispense: 112 tablet; Refill: 3  - discuss takedown at another visit    5. Candidiasis of vulva and vagina (yeast infection)  -     fluconazole (DIFLUCAN) 150 MG tablet; Take 1 tablet (150 mg) by mouth once for 1 dose    Please call or return to clinic if your symptoms don't go away.    Follow up plan  Please make a clinic appointment for follow up with me (NONA STAUFFER) in 1-2  months for anxiety and pelvic pain follow up.    There are no discontinued medications.  Options for treatment and follow-up care were reviewed with the patient. Tiara Andrade  engaged in the decision making process and verbalized understanding of the options discussed and agreed with the final plan.    I spent 40 min face to face with the patient and >50% was spent counselling the patient about the above medical conditions, educating patient and discussing the recommendations and followup.    Nona Staufefr MD  [facility], [facility]

## 2018-01-10 LAB
C TRACH DNA SPEC QL NAA+PROBE: NEGATIVE
N GONORRHOEA DNA SPEC QL NAA+PROBE: NEGATIVE
SPECIMEN SOURCE: NORMAL
SPECIMEN SOURCE: NORMAL

## 2018-01-10 ASSESSMENT — ANXIETY QUESTIONNAIRES: GAD7 TOTAL SCORE: 11

## 2018-01-16 NOTE — PROGRESS NOTES
STI testing is negative for gonorrhea and chlamydia infection. This is reassuring.   Sincerely,   Nona Cullen MD

## 2018-01-31 ENCOUNTER — TELEPHONE (OUTPATIENT)
Dept: FAMILY MEDICINE | Facility: CLINIC | Age: 28
End: 2018-01-31

## 2018-01-31 NOTE — TELEPHONE ENCOUNTER
[facility] phone call message-patient reporting a symptom:     Symptom: Patient states that she just started birth control and has noticed light spotting. She is wanting to know if this is normal. She is requesting to speak with a clinic RN prior to scheduling.    Same Day Visit Offered: Yes, declined    Additional comments:     OK to leave message on voice mail? Yes    Primary language: English      needed? No    Call taken on January 31, 2018 at 3:46 PM by Mahin Rutledge

## 2018-01-31 NOTE — TELEPHONE ENCOUNTER
RN returned call topt. Pt states she finished her period ended on 1/26 and started birth control on Sunday 1/28. Last evening she noticed small amount of blood when wiping and this morning had a little bit of blood. Pt asked if normal. RN explained spotting is normal the first couple months on birthcontrol . Pt stated she had sex last night with no protection. RN stated she should use back up protection (i.e condoms, spermicide) for the first week of starting birth control pills. Pt verbalized understanding and stated she was going to take plan b. RN instructed to call back with further questions       Amisha Ybarra RN

## 2018-02-20 ENCOUNTER — TELEPHONE (OUTPATIENT)
Dept: FAMILY MEDICINE | Facility: CLINIC | Age: 28
End: 2018-02-20

## 2018-02-20 NOTE — TELEPHONE ENCOUNTER
[facility] phone call message-patient reporting a symptom:     Symptom: Still bleeding after being on birth control for 1 month      Additional comments: Patient is concerned if this is normal.  Please return call.  (Patient has appointment on 2/23/18 for possible change in birth control: from pills to insertion)    OK to leave message on voice mail? Yes    Primary language: English      needed? No    Call taken on February 20, 2018 at 9:44 AM by Gabriela Noel

## 2018-02-20 NOTE — TELEPHONE ENCOUNTER
Patient returned call to clinic. States she started birth control pills about a month ago. Reports she was instructed to skip the sugar pills so she only gets her menstrual period every three months. Patient states she was spotting at first but is experiencing her menstrual period as she used to at the same time she usually would menstruate even while taking birth control. Explained to patient it may take a few months of taking her birth control pills for her body to adjust and spotting is not uncommon. Advised patient to schedule appointment if symptoms persist or worsen. Patient verbalized understanding.    Jenn Blackwood RN

## 2018-03-05 ENCOUNTER — TELEPHONE (OUTPATIENT)
Dept: FAMILY MEDICINE | Facility: CLINIC | Age: 28
End: 2018-03-05

## 2018-03-05 NOTE — TELEPHONE ENCOUNTER
[facility] phone call message-patient reporting a symptom:     Symptom: abdominal cramps, lower back pain, nausea, spotting and fatigue.      Same Day Visit Offered: Yes, declined    Additional comments: Pt states she started taking a birth control pill almost two month ago, but sine a week ago she start having abdominal cramps, lower back pain, nausea, spotting and fatigue. Patient would like a nurse advise.     OK to leave message on voice mail? Yes    Primary language: English      needed? No    Call taken on March 5, 2018 at 10:06 AM by Felisha Chacko

## 2018-03-09 ENCOUNTER — OFFICE VISIT (OUTPATIENT)
Dept: FAMILY MEDICINE | Facility: CLINIC | Age: 28
End: 2018-03-09
Payer: COMMERCIAL

## 2018-03-09 VITALS
TEMPERATURE: 98.3 F | BODY MASS INDEX: 20.27 KG/M2 | HEART RATE: 52 BPM | DIASTOLIC BLOOD PRESSURE: 66 MMHG | RESPIRATION RATE: 16 BRPM | OXYGEN SATURATION: 98 % | WEIGHT: 125.6 LBS | SYSTOLIC BLOOD PRESSURE: 110 MMHG

## 2018-03-09 DIAGNOSIS — N89.8 VAGINAL ITCHING: ICD-10-CM

## 2018-03-09 DIAGNOSIS — K21.9 GASTROESOPHAGEAL REFLUX DISEASE, ESOPHAGITIS PRESENCE NOT SPECIFIED: Primary | ICD-10-CM

## 2018-03-09 DIAGNOSIS — F41.1 GAD (GENERALIZED ANXIETY DISORDER): ICD-10-CM

## 2018-03-09 DIAGNOSIS — N94.10 DYSPAREUNIA IN FEMALE: ICD-10-CM

## 2018-03-09 DIAGNOSIS — N94.6 DYSMENORRHEA: ICD-10-CM

## 2018-03-09 RX ORDER — FLUCONAZOLE 150 MG/1
150 TABLET ORAL ONCE
Qty: 1 TABLET | Refills: 0 | Status: SHIPPED | OUTPATIENT
Start: 2018-03-09 | End: 2018-03-09

## 2018-03-09 RX ORDER — NORGESTIMATE AND ETHINYL ESTRADIOL 0.25-0.035
1 KIT ORAL DAILY
Qty: 112 TABLET | Refills: 3 | Status: SHIPPED | OUTPATIENT
Start: 2018-03-09 | End: 2018-04-02 | Stop reason: SINTOL

## 2018-03-09 RX ORDER — PYRIDOXINE HCL (VITAMIN B6) 50 MG
50 TABLET ORAL DAILY
Qty: 60 TABLET | Refills: 3 | Status: CANCELLED | OUTPATIENT
Start: 2018-03-09

## 2018-03-09 RX ORDER — HYDROXYZINE HYDROCHLORIDE 25 MG/1
25 TABLET, FILM COATED ORAL EVERY 6 HOURS PRN
Qty: 30 TABLET | Refills: 11 | Status: SHIPPED | OUTPATIENT
Start: 2018-03-09 | End: 2018-11-20

## 2018-03-09 NOTE — PROGRESS NOTES
Preceptor Attestation:   Patient seen and discussed with the resident. Assessment and plan reviewed with resident and agreed upon.   Supervising Physician:  Pan Alexander MD  [facility]

## 2018-03-09 NOTE — MR AVS SNAPSHOT
After Visit Summary   3/9/2018    Tiara Andrade    MRN: [id number]           Patient Information     Date Of Birth          1990        Visit Information        Provider Department      3/9/2018 1:20 PM Jessica Coello MD [facility]        Today's Diagnoses     Gastroesophageal reflux disease, esophagitis presence not specified    -  1    JASON (generalized anxiety disorder)        Dyspareunia in female        Dysmenorrhea          Care Instructions    Here is the plan from today's visit    1. JASON (generalized anxiety disorder)  Think about starting a medication for anxiety.   - hydrOXYzine (ATARAX) 25 MG tablet; Take 1 tablet (25 mg) by mouth every 6 hours as needed for anxiety  Dispense: 30 tablet; Refill: 11    2. Dyspareunia in female  Please complete your last pill packet and return to clinic for re-evaluation.   - norgestimate-ethinyl estradiol (ORTHO-CYCLEN, SPRINTEC) 0.25-35 MG-MCG per tablet; Take 1 tablet by mouth daily Take hormone pills only, skip last week of each pack until 3rd pack, then take full pack.  Dispense: 112 tablet; Refill: 3    3. Dysmenorrhea  - norgestimate-ethinyl estradiol (ORTHO-CYCLEN, SPRINTEC) 0.25-35 MG-MCG per tablet; Take 1 tablet by mouth daily Take hormone pills only, skip last week of each pack until 3rd pack, then take full pack.  Dispense: 112 tablet; Refill: 3    4. Gastroesophageal reflux disease, esophagitis presence not specified  Please return to clinic for talking about heartburn.   - ranitidine (ZANTAC) 300 MG tablet; Take 0.5 tablets (150 mg) by mouth 2 times daily  Dispense: 30 tablet; Refill: 1      Please call or return to clinic if your symptoms don't go away.    Follow up plan  Please make a clinic appointment for follow up with me (JESSICA COLELO) in 4 weeks.    Thank you for coming to [facility] today.  Lab Testing:  **If you had lab testing today and your results are reassuring or normal they will be mailed to you or  sent through [employer] within 7 days.   **If the lab tests need quick action we will call you with the results.  The phone number we will call with results is # [phone] (home) . If this is not the best number please call our clinic and change the number.  Medication Refills:  If you need any refills please call your pharmacy and they will contact us.   If you need to  your refill at a new pharmacy, please contact the new pharmacy directly. The new pharmacy will help you get your medications transferred faster.   Scheduling:  If you have any concerns about today's visit or wish to schedule another appointment please call our office during normal business hours [phone] (8-5:00 M-F)  If a referral was made to a [facility] and you don't get a call from central scheduling please call [phone].  If a Mammogram was ordered for you at [facility] call [phone] to schedule or change your appointment.  If you had an XRay/CT/Ultrasound/MRI ordered the number is [phone] to schedule or change your radiology appointment.   Medical Concerns:  If you have urgent medical concerns please call [phone] at any time of the day.  If you have a medical emergency please call 281.            Follow-ups after your visit        Who to contact     Please call your clinic at [phone] to:    Ask questions about your health    Make or cancel appointments    Discuss your medicines    Learn about your test results    Speak to your doctor            Additional Information About Your Visit        [employer] Information     [employer] gives you secure access to your electronic health record. If you see a primary care provider, you can also send messages to your care team and make appointments. If you have questions, please call your primary care clinic.  If you do not have a primary care provider, please call [phone] and they will assist you.      [employer] is an electronic gateway that  provides easy, online access to your medical records. With [facility], you can request a clinic appointment, read your test results, renew a prescription or communicate with your care team.     To access your existing account, please contact your [facility] or call [phone] for assistance.        Care EveryWhere ID     This is your Care EveryWhere ID. This could be used by other organizations to access your [facility] medical records  [id number]        Your Vitals Were     Pulse Temperature Respirations Last Period Pulse Oximetry BMI (Body Mass Index)    52 98.3  F (36.8  C) (Oral) 16 02/17/2018 98% 20.27 kg/m2       Blood Pressure from Last 3 Encounters:   03/09/18 110/66   01/09/18 105/65   07/26/17 120/72    Weight from Last 3 Encounters:   03/09/18 125 lb 9.6 oz (57 kg)   01/09/18 128 lb 6.4 oz (58.2 kg)   07/26/17 136 lb 9.6 oz (62 kg)              Today, you had the following     No orders found for display         Today's Medication Changes          These changes are accurate as of 3/9/18  2:29 PM.  If you have any questions, ask your nurse or doctor.               Start taking these medicines.        Dose/Directions    ranitidine 300 MG tablet   Commonly known as:  ZANTAC   Used for:  Gastroesophageal reflux disease, esophagitis presence not specified        Dose:  150 mg   Take 0.5 tablets (150 mg) by mouth 2 times daily   Quantity:  30 tablet   Refills:  1            Where to get your medicines      These medications were sent to [facility] [place], [place] - [address] [facility] [address]     Phone:  [phone]     hydrOXYzine 25 MG tablet    norgestimate-ethinyl estradiol 0.25-35 MG-MCG per tablet    ranitidine 300 MG tablet                Primary Care Provider Office Phone # Fax #    Nona Cullen [phone]       [address] [facility] [address]        Equal Access to Services     ABRAHAM MARTINEZ AH: Vilma  keanu Seals, watrevonda luqadaha, qaybta kaalmada jonathanoscar, waxgenia frandy levyhannafauzia hinds chad. So [facility] [phone].    ATENCIÓN: Si gonzalo fuentes, tiene a whyte disposición servicios gratuitos de asistencia lingüística. Abida al [phone].    We comply with applicable federal civil rights laws and [place] laws. We do not discriminate on the basis of race, color, national origin, age, disability, sex, sexual orientation, or gender identity.            Thank you!     Thank you for choosing [facility]  for your care. Our goal is always to provide you with excellent care. Hearing back from our patients is one way we can continue to improve our services. Please take a few minutes to complete the written survey that you may receive in the mail after your visit with us. Thank you!             Your Updated Medication List - Protect others around you: Learn how to safely use, store and throw away your medicines at www.disposemymeds.org.          This list is accurate as of 3/9/18  2:29 PM.  Always use your most recent med list.                   Brand Name Dispense Instructions for use Diagnosis    hydrOXYzine 25 MG tablet    ATARAX    30 tablet    Take 1 tablet (25 mg) by mouth every 6 hours as needed for anxiety    JASON (generalized anxiety disorder)       ibuprofen 600 MG tablet    ADVIL/MOTRIN    60 tablet    Take 1 tablet (600 mg) by mouth every 6 hours    Dysmenorrhea       norgestimate-ethinyl estradiol 0.25-35 MG-MCG per tablet    ORTHO-CYCLEN, SPRINTEC    112 tablet    Take 1 tablet by mouth daily Take hormone pills only, skip last week of each pack until 3rd pack, then take full pack.    Dyspareunia in female, Dysmenorrhea       propranolol 40 MG tablet    INDERAL    30 tablet    Take 1 tablet (40 mg) by mouth daily 60-90 minutes prior to stress-inducing event    JASON (generalized anxiety disorder)       pyridOXINE 50 MG tablet    VITAMIN B-6    60 tablet    Take 1 tablet (50  mg) by mouth daily    Dysmenorrhea       ranitidine 300 MG tablet    ZANTAC    30 tablet    Take 0.5 tablets (150 mg) by mouth 2 times daily    Gastroesophageal reflux disease, esophagitis presence not specified

## 2018-03-09 NOTE — PROGRESS NOTES
HPI:       Tiara Andrade is a 28 year old who presents for the following  Patient presents with:  Contraception: discussion about birth control, was having spotting  Smoking Cessation: wants to discuss  Derm Problem: vaginal itching, similar symptoms to yeast infection    Birth control: Last unprotected intercourse on 03/06/18. Spotting started on the Feb 14th. Got the period on the 17th, last 5 days. Spotting since then.     Took diflucan in Jan. Is having similar symptoms again. Having itching around vulva. No abnormal discharge.     Problem, Medication and Allergy Lists were reviewed and are current.  Patient is an established patient of this clinic.         Review of Systems:   Review of Systems   Constitutional: Positive for fatigue. Negative for chills and fever.   HENT: Negative.    Eyes: Negative.    Respiratory: Negative for cough and shortness of breath.    Cardiovascular: Negative for chest pain.   Gastrointestinal: Positive for abdominal pain (heartburn). Negative for constipation, diarrhea, nausea and vomiting.   Genitourinary: Positive for menstrual problem. Negative for difficulty urinating, dysuria and hematuria.   Musculoskeletal: Positive for back pain (chronic pain). Negative for arthralgias and myalgias.   Neurological: Positive for headaches (sometimes). Negative for dizziness and light-headedness.             Physical Exam:   Patient Vitals for the past 24 hrs:   BP Temp Temp src Pulse Resp SpO2 Weight   03/09/18 1332 110/66 98.3  F (36.8  C) Oral 52 16 98 % 125 lb 9.6 oz (57 kg)     Body mass index is 20.27 kg/(m^2).  Vitals were reviewed and were normal     Physical Exam   Constitutional: She appears well-developed and well-nourished.   HENT:   Head: Normocephalic and atraumatic.   Mouth/Throat: Oropharynx is clear and moist. No oropharyngeal exudate.   Eyes: Conjunctivae and EOM are normal. Pupils are equal, round, and reactive to light. No scleral icterus.   Neck: Normal range of motion.  Neck supple.   Cardiovascular: Normal rate, regular rhythm and normal heart sounds.    Pulmonary/Chest: Effort normal and breath sounds normal. No respiratory distress.   Abdominal: Soft. Bowel sounds are normal. She exhibits no distension. There is tenderness in the epigastric area. There is no rigidity.   Musculoskeletal: Normal range of motion.   Lymphadenopathy:     She has no cervical adenopathy.   Neurological: She is alert. No cranial nerve deficit.   Psychiatric: Her mood appears anxious.         Results:   No investigations ordered     Assessment and Plan     1. JASON (generalized anxiety disorder)  Refilled medication. Reports that anxiety is not well controlled with hydroxyzine. She is not interested in starting a medication (SSRI) at this time. Will think about it and follow up in clinic soon. Discussed that she would also benefit from therapy.   - hydrOXYzine (ATARAX) 25 MG tablet; Take 1 tablet (25 mg) by mouth every 6 hours as needed for anxiety  Dispense: 30 tablet; Refill: 11    2. Dysmenorrhea  Is having spotting on the OCP. Discussed finishing a course of OCP for a total of 3 months and then following up in clinic. She reports good medication adherence and that she has not missed any medication. Recommend following up in a month to reassess symptoms. She has not completed the pelvic US that was ordered. Will remind her to get it done and follow up in a month.   - norgestimate-ethinyl estradiol (ORTHO-CYCLEN, SPRINTEC) 0.25-35 MG-MCG per tablet; Take 1 tablet by mouth daily Take hormone pills only, skip last week of each pack until 3rd pack, then take full pack.  Dispense: 112 tablet; Refill: 3    3. Gastroesophageal reflux disease, esophagitis presence not specified  She denies any melena, weight loss, night sweats or hematemesis. Takes zantac as needed. Dietary education provided to avoid heartburn. Recommend taking the medication daily instead of as needed. If no improvement, return for  re-evaluation.   - ranitidine (ZANTAC) 300 MG tablet; Take 0.5 tablets (150 mg) by mouth 2 times daily  Dispense: 30 tablet; Refill: 1    4. Vaginal itching  Had yeast infection in January. Reports having similar symptoms right now. Would like to get treatment again. Recommend following up for a pelvic exam if symptoms not better in another week.   - fluconazole (DIFLUCAN) 150 MG tablet; Take 1 tablet (150 mg) by mouth once for 1 dose  Dispense: 1 tablet; Refill: 0  There are no discontinued medications.  Options for treatment and follow-up care were reviewed with the patient. Tiara Andrade  engaged in the decision making process and verbalized understanding of the options discussed and agreed with the final plan.    Jessica Coello MD MPH  PGY3- [facility], Family Medicine  Pager- [phone]

## 2018-03-09 NOTE — PATIENT INSTRUCTIONS
Here is the plan from today's visit    1. JASON (generalized anxiety disorder)  Think about starting a medication for anxiety.   - hydrOXYzine (ATARAX) 25 MG tablet; Take 1 tablet (25 mg) by mouth every 6 hours as needed for anxiety  Dispense: 30 tablet; Refill: 11    2. Dyspareunia in female  Please complete your last pill packet and return to clinic for re-evaluation.   - norgestimate-ethinyl estradiol (ORTHO-CYCLEN, SPRINTEC) 0.25-35 MG-MCG per tablet; Take 1 tablet by mouth daily Take hormone pills only, skip last week of each pack until 3rd pack, then take full pack.  Dispense: 112 tablet; Refill: 3    3. Dysmenorrhea  - norgestimate-ethinyl estradiol (ORTHO-CYCLEN, SPRINTEC) 0.25-35 MG-MCG per tablet; Take 1 tablet by mouth daily Take hormone pills only, skip last week of each pack until 3rd pack, then take full pack.  Dispense: 112 tablet; Refill: 3    4. Gastroesophageal reflux disease, esophagitis presence not specified  Please return to clinic for talking about heartburn.   - ranitidine (ZANTAC) 300 MG tablet; Take 0.5 tablets (150 mg) by mouth 2 times daily  Dispense: 30 tablet; Refill: 1      Please call or return to clinic if your symptoms don't go away.    Follow up plan  Please make a clinic appointment for follow up with me (ANSLEY WATSON) in 4 weeks.    Thank you for coming to [facility] today.  Lab Testing:  **If you had lab testing today and your results are reassuring or normal they will be mailed to you or sent through [employer] within 7 days.   **If the lab tests need quick action we will call you with the results.  The phone number we will call with results is # [phone] (home) . If this is not the best number please call our clinic and change the number.  Medication Refills:  If you need any refills please call your pharmacy and they will contact us.   If you need to  your refill at a new pharmacy, please contact the new pharmacy directly. The new pharmacy will help you get  your medications transferred faster.   Scheduling:  If you have any concerns about today's visit or wish to schedule another appointment please call our office during normal business hours [phone] (8-5:00 M-F)  If a referral was made to a [facility] and you don't get a call from central scheduling please call [phone].  If a Mammogram was ordered for you at [facility] call [phone] to schedule or change your appointment.  If you had an XRay/CT/Ultrasound/MRI ordered the number is [phone] to schedule or change your radiology appointment.   Medical Concerns:  If you have urgent medical concerns please call [phone] at any time of the day.  If you have a medical emergency please call 176.

## 2018-03-11 ASSESSMENT — ENCOUNTER SYMPTOMS
BACK PAIN: 1
EYES NEGATIVE: 1
COUGH: 0
ABDOMINAL PAIN: 1
VOMITING: 0
CHILLS: 0
ARTHRALGIAS: 0
SHORTNESS OF BREATH: 0
DIARRHEA: 0
DYSURIA: 0
MYALGIAS: 0
DIZZINESS: 0
HEADACHES: 1
NAUSEA: 0
FEVER: 0
DIFFICULTY URINATING: 0
LIGHT-HEADEDNESS: 0
HEMATURIA: 0
FATIGUE: 1
CONSTIPATION: 0

## 2018-04-02 ENCOUNTER — OFFICE VISIT (OUTPATIENT)
Dept: FAMILY MEDICINE | Facility: CLINIC | Age: 28
End: 2018-04-02
Payer: COMMERCIAL

## 2018-04-02 ENCOUNTER — TELEPHONE (OUTPATIENT)
Dept: FAMILY MEDICINE | Facility: CLINIC | Age: 28
End: 2018-04-02

## 2018-04-02 VITALS
DIASTOLIC BLOOD PRESSURE: 73 MMHG | OXYGEN SATURATION: 97 % | WEIGHT: 128.6 LBS | SYSTOLIC BLOOD PRESSURE: 122 MMHG | RESPIRATION RATE: 16 BRPM | TEMPERATURE: 98.3 F | HEART RATE: 61 BPM | BODY MASS INDEX: 20.76 KG/M2

## 2018-04-02 DIAGNOSIS — F41.1 GAD (GENERALIZED ANXIETY DISORDER): ICD-10-CM

## 2018-04-02 DIAGNOSIS — Z11.3 ROUTINE SCREENING FOR STI (SEXUALLY TRANSMITTED INFECTION): Primary | ICD-10-CM

## 2018-04-02 DIAGNOSIS — N92.0 MENORRHAGIA WITH REGULAR CYCLE: ICD-10-CM

## 2018-04-02 DIAGNOSIS — Z00.00 HEALTHCARE MAINTENANCE: ICD-10-CM

## 2018-04-02 LAB
HBV SURFACE AG SERPL QL IA: NONREACTIVE
HCG UR QL: NEGATIVE
HEMOGLOBIN: 13 G/DL (ref 11.7–15.7)
HIV 1+2 AB+HIV1 P24 AG SERPL QL IA: NONREACTIVE

## 2018-04-02 RX ORDER — NORETHINDRONE ACETATE AND ETHINYL ESTRADIOL 1.5-30(21)
1 KIT ORAL DAILY
Qty: 84 TABLET | Refills: 3 | Status: SHIPPED | OUTPATIENT
Start: 2018-04-02 | End: 2018-11-20

## 2018-04-02 ASSESSMENT — ANXIETY QUESTIONNAIRES
6. BECOMING EASILY ANNOYED OR IRRITABLE: MORE THAN HALF THE DAYS
3. WORRYING TOO MUCH ABOUT DIFFERENT THINGS: MORE THAN HALF THE DAYS
IF YOU CHECKED OFF ANY PROBLEMS ON THIS QUESTIONNAIRE, HOW DIFFICULT HAVE THESE PROBLEMS MADE IT FOR YOU TO DO YOUR WORK, TAKE CARE OF THINGS AT HOME, OR GET ALONG WITH OTHER PEOPLE: SOMEWHAT DIFFICULT
GAD7 TOTAL SCORE: 14
7. FEELING AFRAID AS IF SOMETHING AWFUL MIGHT HAPPEN: SEVERAL DAYS
5. BEING SO RESTLESS THAT IT IS HARD TO SIT STILL: MORE THAN HALF THE DAYS
2. NOT BEING ABLE TO STOP OR CONTROL WORRYING: SEVERAL DAYS
1. FEELING NERVOUS, ANXIOUS, OR ON EDGE: NEARLY EVERY DAY

## 2018-04-02 ASSESSMENT — PATIENT HEALTH QUESTIONNAIRE - PHQ9: 5. POOR APPETITE OR OVEREATING: NEARLY EVERY DAY

## 2018-04-02 NOTE — MR AVS SNAPSHOT
After Visit Summary   4/2/2018    Tiara Andrade    MRN: [id number]           Patient Information     Date Of Birth          1990        Visit Information        Provider Department      4/2/2018 1:40 PM Nona Stauffer MD [facility]        Today's Diagnoses     Routine screening for STI (sexually transmitted infection)    -  1    Menorrhagia with regular cycle        JASON (generalized anxiety disorder)          Care Instructions        Here is the plan from today's visit    1. Menorrhagia with regular cycle  Start new birth control pills either after your period ends, or on Sunday 4/8  Take all the pills in the pack, you will get a period every month  I would expect regular cycles again in 3 months. Come back if you're still having unusual bleeding between periods.   After 3 months, if having regular periods, ok to try as Extended Cycle (period every 3 months)  - norethindrone-ethinyl estradiol-iron (MICROGESTIN FE1.5/30) 1.5-30 MG-MCG per tablet; Take 1 tablet by mouth daily  Dispense: 84 tablet; Refill: 3  - Hemoglobin lab today      2. Routine screening for STI (sexually transmitted infection)  - Neisseria gonorrhoeae PCR  - Chlamydia trachomatis PCR  - Wet Prep (LabDAQ)  - Anti Treponema  - HIV Antigen Antibody Combo  - Hepatitis B surface antigen    3. JASON (generalized anxiety disorder)  Someone will call you to schedule therapy at [facility]  - BEHAVIORAL HEALTH REFERRAL ([facility] interal and external)    Please call or return to clinic if your symptoms don't go away.    Follow up plan  Please make a clinic appointment for follow up with me (NONA STAUFFER) in 3  months for menstruation follow up.    Thank you for coming to [facility] today.  Lab Testing:  **If you had lab testing today and your results are reassuring or normal they will be mailed to you or sent through [employer] within 7 days.   **If the lab tests need quick action we will call you with the  results.  The phone number we will call with results is # [phone] (home) . If this is not the best number please call our clinic and change the number.  Medication Refills:  If you need any refills please call your pharmacy and they will contact us.   If you need to  your refill at a new pharmacy, please contact the new pharmacy directly. The new pharmacy will help you get your medications transferred faster.   Scheduling:  If you have any concerns about today's visit or wish to schedule another appointment please call our office during normal business hours [phone] (8-5:00 M-F)  If a referral was made to a [facility] and you don't get a call from central scheduling please call [phone].  If a Mammogram was ordered for you at [facility] call [phone] to schedule or change your appointment.  If you had an XRay/CT/Ultrasound/MRI ordered the number is [phone] to schedule or change your radiology appointment.   Medical Concerns:  If you have urgent medical concerns please call [phone] at any time of the day.    Nona Stauffer MD            Follow-ups after your visit        Additional Services     BEHAVIORAL HEALTH REFERRAL ([facility] interal and external)       Referring MD: NONA STAUFFER    May leave message on voicemail: Yes  [id number]                  Who to contact     Please call your clinic at [phone] to:    Ask questions about your health    Make or cancel appointments    Discuss your medicines    Learn about your test results    Speak to your doctor            Additional Information About Your Visit        [email or web address] Information     [email or web address] gives you secure access to your electronic health record. If you see a primary care provider, you can also send messages to your care team and make appointments. If you have questions, please call your primary care clinic.  If you do not have a primary care provider, please call [phone] and they  will assist you.      "[employer] is an electronic gateway that provides easy, online access to your medical records. With "[employer] you can request a clinic appointment, read your test results, renew a prescription or communicate with your care team.     To access your existing account, please contact your [facility] or call [phone] for assistance.        Care EveryWhere ID     This is your Care EveryWhere ID. This could be used by other organizations to access your [facility] medical records  [id number]        Your Vitals Were     Pulse Temperature Respirations Pulse Oximetry BMI (Body Mass Index)       61 98.3  F (36.8  C) (Oral) 16 97% 20.76 kg/m2        Blood Pressure from Last 3 Encounters:   04/02/18 122/73   03/09/18 110/66   01/09/18 105/65    Weight from Last 3 Encounters:   04/02/18 128 lb 9.6 oz (58.3 kg)   03/09/18 125 lb 9.6 oz (57 kg)   01/09/18 128 lb 6.4 oz (58.2 kg)              We Performed the Following     Anti Treponema     BEHAVIORAL HEALTH REFERRAL ([facility] interal and external)     Chlamydia trachomatis PCR     Hemoglobin (HGB) ([facility])     Hepatitis B surface antigen     HIV Antigen Antibody Combo     Neisseria gonorrhoeae PCR     Wet Prep (LabDAQ)          Today's Medication Changes          These changes are accurate as of 4/2/18  2:29 PM.  If you have any questions, ask your nurse or doctor.               Start taking these medicines.        Dose/Directions    norethindrone-ethinyl estradiol-iron 1.5-30 MG-MCG per tablet   Commonly known as:  MICROGESTIN FE1.5/30   Used for:  Menorrhagia with regular cycle   Started by:  Nona Cullen MD        Dose:  1 tablet   Take 1 tablet by mouth daily   Quantity:  84 tablet   Refills:  3         Stop taking these medicines if you haven't already. Please contact your care team if you have questions.     norgestimate-ethinyl estradiol 0.25-35 MG-MCG per tablet   Commonly known as:  ORTHO-CYCLEN, SPRINTEC    Stopped by:  Nona Cullen MD           pyridOXINE 50 MG tablet   Commonly known as:  VITAMIN B-6   Stopped by:  Nona Cullen MD                Where to get your medicines      These medications were sent to [facility] [place], [place] - [address] [facility] [address]     Phone:  [phone]     norethindrone-ethinyl estradiol-iron 1.5-30 MG-MCG per tablet                Primary Care Provider Office Phone # Fax #    Nona Cullen [phone]       [address]  [facility] [address]        Equal Access to Services     : Hadii keanu Seals, waaxda lusandraadaha, qaybta kaalmada ira, lydia hinds . So [facility] [phone].    ATENCIÓN: Si habla español, tiene a whyte disposición servicios gratuitos de asistencia lingüística. [facility] [phone].    We comply with applicable federal civil rights laws and [place] laws. We do not discriminate on the basis of race, color, national origin, age, disability, sex, sexual orientation, or gender identity.            Thank you!     Thank you for choosing [facility]  for your care. Our goal is always to provide you with excellent care. Hearing back from our patients is one way we can continue to improve our services. Please take a few minutes to complete the written survey that you may receive in the mail after your visit with us. Thank you!             Your Updated Medication List - Protect others around you: Learn how to safely use, store and throw away your medicines at www.disposemymeds.org.          This list is accurate as of 4/2/18  2:29 PM.  Always use your most recent med list.                   Brand Name Dispense Instructions for use Diagnosis    hydrOXYzine 25 MG tablet    ATARAX    30 tablet    Take 1 tablet (25 mg) by mouth every 6 hours as needed for anxiety    JASON (generalized anxiety disorder)       ibuprofen 600  MG tablet    ADVIL/MOTRIN    60 tablet    Take 1 tablet (600 mg) by mouth every 6 hours    Dysmenorrhea       norethindrone-ethinyl estradiol-iron 1.5-30 MG-MCG per tablet    MICROGESTIN FE1.5/30    84 tablet    Take 1 tablet by mouth daily    Menorrhagia with regular cycle       propranolol 40 MG tablet    INDERAL    30 tablet    Take 1 tablet (40 mg) by mouth daily 60-90 minutes prior to stress-inducing event    JASON (generalized anxiety disorder)       ranitidine 300 MG tablet    ZANTAC    30 tablet    Take 0.5 tablets (150 mg) by mouth 2 times daily    Gastroesophageal reflux disease, esophagitis presence not specified

## 2018-04-02 NOTE — PATIENT INSTRUCTIONS
Here is the plan from today's visit    1. Menorrhagia with regular cycle  Start new birth control pills either after your period ends, or on Sunday 4/8  Take all the pills in the pack, you will get a period every month  I would expect regular cycles again in 3 months. Come back if you're still having unusual bleeding between periods.   After 3 months, if having regular periods, ok to try as Extended Cycle (period every 3 months)  - norethindrone-ethinyl estradiol-iron (MICROGESTIN FE1.5/30) 1.5-30 MG-MCG per tablet; Take 1 tablet by mouth daily  Dispense: 84 tablet; Refill: 3  - Hemoglobin lab today      2. Routine screening for STI (sexually transmitted infection)  - Neisseria gonorrhoeae PCR  - Chlamydia trachomatis PCR  - Wet Prep (LabDAQ)  - Anti Treponema  - HIV Antigen Antibody Combo  - Hepatitis B surface antigen    3. JASON (generalized anxiety disorder)  Someone will call you to schedule therapy at [facility]  - BEHAVIORAL HEALTH REFERRAL ([facility] interal and external)    Please call or return to clinic if your symptoms don't go away.    Follow up plan  Please make a clinic appointment for follow up with me (KAYA STAUFFER) in 3  months for menstruation follow up.    Thank you for coming to [facility] today.  Lab Testing:  **If you had lab testing today and your results are reassuring or normal they will be mailed to you or sent through [employer] within 7 days.   **If the lab tests need quick action we will call you with the results.  The phone number we will call with results is # [phone] (home) . If this is not the best number please call our clinic and change the number.  Medication Refills:  If you need any refills please call your pharmacy and they will contact us.   If you need to  your refill at a new pharmacy, please contact the new pharmacy directly. The new pharmacy will help you get your medications transferred faster.   Scheduling:  If you have any concerns about today's  visit or wish to schedule another appointment please call our office during normal business hours [phone] (8-5:00 M-F)  If a referral was made to a [facility] and you don't get a call from central scheduling please call [phone].  If a Mammogram was ordered for you at [facility] call [phone] to schedule or change your appointment.  If you had an XRay/CT/Ultrasound/MRI ordered the number is [phone] to schedule or change your radiology appointment.   Medical Concerns:  If you have urgent medical concerns please call [phone] at any time of the day.    Nona Cullen MD

## 2018-04-02 NOTE — TELEPHONE ENCOUNTER
Mental Health Referral:  Please schedule with Dr. Kimbrough      If you are unable to reach the patient after two phone attempts, please send a letter and close the encounter.      Thank you!

## 2018-04-02 NOTE — PROGRESS NOTES
"      HPI:       Tiara Andrade is a 28 year old who presents for the following  Patient presents with:  Vaginal Problem: Feb and March she has had her period the whole time. Spotting and heavy periods on and off. Vaginal itching occuring. Occasional abdominal pain.   Fatigue: she has been feeling fatigued daily since starting the Birth Control      Vaginal Bleeding /Dysmenorrhea  Onset: 2 months    Description:   Duration of bleeding episodes: consistently heavy throughout the month but mainly for 1 wk then spotting  Frequency between periods:  Never go away  Describe bleeding/flow:   Clots: no  Number of pads/hour: 2  Cramping: mild    Accompanying Signs & Symptoms:  Weakness: no  Lightheadedness: occasionally in the morning/afternoon  Hot flashes: Yes Details: at night time she has chills/night sweats  Nosebleeds/Easy bruising: no  Vaginal Discharge: no    History:  No LMP recorded.  Previous normal periods: Yes in the past  Contraceptive use: YES  Possibility of Pregnancy: YES- possibly per pt  Any bleeding after intercourse: Yes  Age of first period (menarche): 13  Abnormal PAP Smears: Yes Details: once   Any previous pelvic imaging? no    No pain, no cramps (previously had cramps)  1/28 started OCPs, taking as extended cycle, not been taking placebo days tender breasts  2/12 spotting  2/17-2/26 heavy flow  2/27-3/11 spotting   3/12-3/17 heavy flow  3/18-4/1 spotting  4/2 heavy flow started today    Spotting = dark red and stringy  No days without bleeding.   Heavy days = soaking a large pad every 6 hours  Heavy periods are heavier than her regular periods previously.     Anxiety  Overall feels like anxiety has improved, every thing is \"super mellow\"  Takes hydroxyzine before bed about 50% of the time. When she does, she sleeps from 8:30-5:30am and, feels well rested in the AM.   Takes propranalol whenever heart starts racing fast, about once every 1-2 weeks. Helps a lot  Gets tired throughout the day, eyes get " heavy. Correlates this to days when she doesn't drink coffee.   8 oz of coffee in AM 1-2 times per week (this is reduced)  Associates the tiredness with anxiety, just wants to go home.   No new work stresses.         Problem, Medication and Allergy Lists were reviewed and are current.  Patient is an established patient of this clinic.         Review of Systems:   Review of Systems          Physical Exam:   Patient Vitals for the past 24 hrs:   BP Temp Temp src Pulse Resp SpO2 Weight   04/02/18 1350 122/73 98.3  F (36.8  C) Oral 61 16 97 % 128 lb 9.6 oz (58.3 kg)     Body mass index is 20.76 kg/(m^2).  Vitals were reviewed and were normal     Physical Exam   Constitutional: She appears well-developed and well-nourished.   HENT:   Head: Normocephalic and atraumatic.   Eyes: Conjunctivae are normal.   Cardiovascular: Normal rate.    Pulmonary/Chest: Effort normal. No respiratory distress.   Skin: Skin is warm and dry. No rash noted. No erythema. No pallor.   Psychiatric: Her speech is normal. Her mood appears anxious.   Vitals reviewed.        Results:     Results for orders placed or performed in visit on 04/02/18   Anti Treponema   Result Value Ref Range    Treponema pallidum Antibody Negative NEG^Negative   HIV Antigen Antibody Combo   Result Value Ref Range    HIV Antigen Antibody Combo Nonreactive NR^Nonreactive       Hepatitis B surface antigen   Result Value Ref Range    Hep B Surface Agn Nonreactive NR^Nonreactive   Hemoglobin (HGB) ([facility])   Result Value Ref Range    Hemoglobin 13.0 11.7 - 15.7 g/dL   HCG Qualitative Urine (UPT) ([facility])   Result Value Ref Range    HCG Qual Urine NEGATIVE Negative   Neisseria gonorrhoeae PCR   Result Value Ref Range    Specimen Descrip Urine     N Gonorrhea PCR Negative NEG^Negative   Chlamydia trachomatis PCR   Result Value Ref Range    Specimen Description Urine     Chlamydia Trachomatis PCR Negative NEG^Negative       Assessment and Plan     Patient Instructions        Here is the plan from today's visit    1. Menorrhagia with regular cycle  Symptoms of estrogen excess, will switch to a lower estrogenic activity. Also, her body might not tolerate extended cycling  Start new birth control pills either after your period ends, or on Sunday 4/8  Take all the pills in the pack, you will get a period every month  I would expect regular cycles again in 3 months. Come back if you're still having unusual bleeding between periods.   After 3 months, if having regular periods, ok to try as Extended Cycle (period every 3 months)  - norethindrone-ethinyl estradiol-iron (MICROGESTIN FE1.5/30) 1.5-30 MG-MCG per tablet; Take 1 tablet by mouth daily  Dispense: 84 tablet; Refill: 3  - Hemoglobin lab today (tiredness, dizziness, heavy menses)      2. Routine screening for STI (sexually transmitted infection)  - Neisseria gonorrhoeae PCR  - Chlamydia trachomatis PCR  - Wet Prep (LabDAQ)  - Anti Treponema  - HIV Antigen Antibody Combo  - Hepatitis B surface antigen    3. JASON (generalized anxiety disorder) with panic attacks  Someone will call you to schedule therapy at [facility]  - BEHAVIORAL HEALTH REFERRAL ([facility] interal and external)    Please call or return to clinic if your symptoms don't go away.    Follow up plan  Please make a clinic appointment for follow up with me (KAYA STAUFFER) in 3  months for menstruation follow up.    There are no discontinued medications.  Options for treatment and follow-up care were reviewed with the patient. Tiararadha Andrade  engaged in the decision making process and verbalized understanding of the options discussed and agreed with the final plan.    I spent 25 min face to face with the patient and >50% was spent counselling the patient about the above medical conditions, educating patient and discussing the recommendations and followup .    Kaya Stauffer MD  [facility]

## 2018-04-03 LAB
C TRACH DNA SPEC QL NAA+PROBE: NEGATIVE
N GONORRHOEA DNA SPEC QL NAA+PROBE: NEGATIVE
SPECIMEN SOURCE: NORMAL
SPECIMEN SOURCE: NORMAL
T PALLIDUM IGG+IGM SER QL: NEGATIVE

## 2018-04-03 ASSESSMENT — ANXIETY QUESTIONNAIRES: GAD7 TOTAL SCORE: 14

## 2018-04-03 ASSESSMENT — PATIENT HEALTH QUESTIONNAIRE - PHQ9: SUM OF ALL RESPONSES TO PHQ QUESTIONS 1-9: 4

## 2018-04-04 NOTE — PROGRESS NOTES
Tiara,     The results from your recent testing are normal.  - Your STD testing was negative (HIV, syphilis, Hepatitis B, gonorrhea, chlamydia)  - your pregnancy test was negative  - your hemoglobin was negative, it's unlikely that you have low blood iron levels, and you haven't lost too much blood with the constant periods.    If you have any further questions feel free to contact me via [employer] message.     Sincerely,   Nona Cullen MD

## 2018-05-17 ENCOUNTER — OFFICE VISIT (OUTPATIENT)
Dept: FAMILY MEDICINE | Facility: CLINIC | Age: 28
End: 2018-05-17
Payer: COMMERCIAL

## 2018-05-17 VITALS
HEART RATE: 56 BPM | SYSTOLIC BLOOD PRESSURE: 115 MMHG | BODY MASS INDEX: 20.3 KG/M2 | WEIGHT: 125.8 LBS | DIASTOLIC BLOOD PRESSURE: 70 MMHG | RESPIRATION RATE: 12 BRPM | OXYGEN SATURATION: 99 %

## 2018-05-17 DIAGNOSIS — N89.8 VAGINAL ITCHING: Primary | ICD-10-CM

## 2018-05-17 DIAGNOSIS — B37.31 RECURRENT CANDIDIASIS OF VAGINA: ICD-10-CM

## 2018-05-17 DIAGNOSIS — Z30.9 ENCOUNTER FOR CONTRACEPTIVE MANAGEMENT, UNSPECIFIED TYPE: ICD-10-CM

## 2018-05-17 LAB
BACTERIA: NORMAL
CLUE CELLS: NORMAL
HCG UR QL: NEGATIVE
MOTILE TRICHOMONAS: NEGATIVE
ODOR: POSITIVE
PH WET PREP: >4.5
WBC WET PREP: NORMAL
YEAST: PRESENT

## 2018-05-17 RX ORDER — ACETAMINOPHEN 500 MG
1000 TABLET ORAL
COMMUNITY
Start: 2016-06-10 | End: 2018-07-21

## 2018-05-17 RX ORDER — DEXTROAMPHETAMINE SACCHARATE, AMPHETAMINE ASPARTATE, DEXTROAMPHETAMINE SULFATE AND AMPHETAMINE SULFATE 1.25; 1.25; 1.25; 1.25 MG/1; MG/1; MG/1; MG/1
5 TABLET ORAL
COMMUNITY
End: 2018-07-21

## 2018-05-17 RX ORDER — FLUCONAZOLE 150 MG/1
150 TABLET ORAL DAILY
Qty: 34 TABLET | Refills: 0 | Status: SHIPPED | OUTPATIENT
Start: 2018-05-17 | End: 2018-07-21

## 2018-05-17 NOTE — PATIENT INSTRUCTIONS
Here is the plan from today's visit    1. Vaginal itching  - Wet Prep ([place])  Recurrent candidiasis of vagina  - fluconazole (DIFLUCAN) 150 MG tablet; Take 1 tablet (150 mg) by mouth daily for 10 days, followed by 150mg once weekly for 6 months  Dispense: 34 tablet; Refill: 0    2. Ccontraceptive management  Attempted Mirena IUD insertion. Plan to return while on period for another attempt. Otherwise plan to start microgestin birth control pills  - [facility]poscopy[facility]Gynecology Clinic[facility] INTERNAL REFERRAL  Can schedule IUD during Tuesday PM GYN clinic or on Dr. Cullen's clinic schedule    Thank you for coming to [facility] today.  Lab Testing:  **If you had lab testing today and your results are reassuring or normal they will be mailed to you or sent through [employer] within 7 days.   **If the lab tests need quick action we will call you with the results.  The phone number we will call with results is # [phone] (home) . If this is not the best number please call our clinic and change the number.  Medication Refills:  If you need any refills please call your pharmacy and they will contact us.   If you need to  your refill at a new pharmacy, please contact the new pharmacy directly. The new pharmacy will help you get your medications transferred faster.   Scheduling:  If you have any concerns about today's visit or wish to schedule another appointment please call our office during normal business hours [phone] (8-5:00 M-F)  If a referral was made to a [facility] and you don't get a call from central scheduling please call [phone].  If a Mammogram was ordered for you at [facility] call [phone] to schedule or change your appointment.  If you had an XRay/CT/Ultrasound/MRI ordered the number is [phone] to schedule or change your radiology appointment.   Medical Concerns:  If you have urgent medical concerns please call [phone] at any time of the  day.    Nona Cullen MD

## 2018-05-17 NOTE — PROGRESS NOTES
"      HPI:       Tiara Andrade is a 28 year old who presents for the following  Patient presents with:  Vaginal Problem: reoccurring itching and irritation X4 months    Vaginal itching  Symptoms start on last day of menses and last several days, itching, intermittent throughout the day. Thin discharge, after itching starts becomes white and tacky    Contraception and DUB  On 18 stopped sprintec, microgestin was prescribed but never taken. Menses resumed . Using condoms (100%) and EC for contraception currently, last EC on . No sex since then.   Nexplanon previously, heavy menses every day for 3 months. Interested in IUD.   She has had 1 pregnancy, which resulted in an induced aspiration  at \"4 weeks\", no sedation.     Vaginal Symptoms  Onset: reoccurring vaginal itching and irritation    Description:  Vaginal Discharge: white   Itching (Pruritis): Yes   Burning sensation: Yes   Odor: no    Accompanying Signs & Symptoms:  Pain with Urination:no  Abdominal Pain: Yes Details: lower abdominal  Fever: no    History:   Sexually active: Yes   New Partner: no  Possibility of Pregnancy:  No    Precipitating factors:   Recent Antibiotic Use: no     Alleviating factors:  Warm water  Therapies Tried and outcome: soaking in warm water,  Adherence and Exercise  Medication side effects: no  How often is a medication missed? Never  Exercise:No exercise None      Problem, Medication and Allergy Lists were reviewed and are current.  Patient is an established patient of this clinic.         Review of Systems:   Review of Systems   Bad lower abdominal pain/cramps in the morning. Having some constipation, blood on stool 1 week ago. Diarrhea (loose, not water) or constipation, then pain resolves.          Physical Exam:   Patient Vitals for the past 24 hrs:   BP Pulse Resp SpO2 Weight   18 1549 115/70 56 12 99 % 125 lb 12.8 oz (57.1 kg)     Body mass index is 20.3 kg/(m^2).  Vitals were reviewed and were normal   "   Physical Exam   Constitutional: She appears well-developed and well-nourished.   HENT:   Head: Normocephalic and atraumatic.   Eyes: Conjunctivae are normal.   Cardiovascular: Normal rate.    Pulmonary/Chest: Effort normal. No respiratory distress.   Genitourinary: Vagina normal.   Genitourinary Comments: Very small nulliparous cervix deviated to the far right, uterus midline/anteverted. Small amount of white clumped discharge in vagina.    Skin: Skin is warm and dry. No rash noted. No erythema. No pallor.   Vitals reviewed.    Procedure note:   IUD Insertion ATTEMPT    Tiara Andraed is a patient of Nona Quigley here for an IUD insertion   Indication: contraception    Counseling: Discussed potential side effects of Paraguard, including increased bleeding and cramping, as well as the Mirena, including unpredictable spotting and amenorrhea.  Patient aware to check for strings every month.    Consent: Affirmation of informed consent was signed and scanned into the medical record. Risks, benefits and alternatives were discussed including risk of infection, bleeding and small risk of uterine perforation.  Patient's questions were elicited and answered.   Procedure safety checklist was completed:  Yes  Time Out (Pause for the Cause) completed: Yes    Labs: UPT negative    Technique:   Patient was premedicated with ibuprofen:   No  Uterine position was confirmed by bimanual exam: Yes   Using a sterile speculum and equipment, the cervix was examined. Cervix very small, nulliparous, and to the far right.      The cervix was cleansed with Betadine prep. A tenaculum was applied to the cervix with tension to straighten the cervical canal.  The endometrial biopsy pipette met resistence at 4cm. Green os finder also met resistence, unable to find or go past the internal cervical os. Attempted several different tenaculum placements (anterior, left, posterior)  EBL: minimal  Complications: IUD not placed  Tolerance: Pt  tolerated procedure well and was in stable condition.  She had a significant amount of cramping and discomfort when the tenaculum was on the cervix only. Consider cervical block for future attempts.     Follow up: Pt was instructed to call if fever, chills, heavy bleeding, severe cramping or foul smelling discharge. May take 600 mg ibuprofen QID prn for mild cramping.         Results:      Results from the last 24 hours  Results for orders placed or performed in visit on 05/17/18 (from the past 24 hour(s))   HCG Qualitative Urine (UPT) ([facility])   Result Value Ref Range    HCG Qual Urine NEGATIVE Negative   Wet Prep ([facility])   Result Value Ref Range    Yeast Wet Prep Present none    Motile Trichomonas Wet Prep Negative Negative    Clue Cells Wet Prep None NONE    WBC WET PREP 2-5 2 - 5    Bacteria Wet Prep Moderate None    pH Wet Prep >4.5 3.8 - 4.5    Odor Wet Prep Positive NONE     Assessment and Plan     Patient Instructions   Here is the plan from today's visit    1. Vaginal itching  - Wet Prep ([facility])  Recurrent candidiasis of vagina  - fluconazole (DIFLUCAN) 150 MG tablet; Take 1 tablet (150 mg) by mouth daily for 10 days, followed by 150mg once weekly for 6 months  Dispense: 34 tablet; Refill: 0    2. Contraceptive management  Attempted Mirena IUD insertion. Plan to return while on period for another attempt. Also will use a cervical block to help with tenaculum discomfort.   Otherwise plan to start microgestin birth control pills.  Continue condoms/EC for now.   Can schedule IUD during Tuesday PM GYN clinic or on Dr. Cullen's clinic schedule  - [facility]      Options for treatment and follow-up care were reviewed with the patient. Tiara Andrade  engaged in the decision making process and verbalized understanding of the options discussed and agreed with the final plan.    I spent 40 min face to face with the patient and >50% was spent counselling the patient  about the above medical conditions, educating patient and discussing the recommendations and followup .    Nona Cullen MD   of [facility], [facility]

## 2018-05-17 NOTE — MR AVS SNAPSHOT
After Visit Summary   5/17/2018    Tiara Andrade    MRN: [id number]           Patient Information     Date Of Birth          1990        Visit Information        Provider Department      5/17/2018 3:40 PM Nona Cullen MD [facility]        Today's Diagnoses     Vaginal itching    -  1    Encounter for contraceptive management, unspecified type        Recurrent candidiasis of vagina          Care Instructions    Here is the plan from today's visit    1. Vaginal itching  - Wet Prep ([facility])  Recurrent candidiasis of vagina  - fluconazole (DIFLUCAN) 150 MG tablet; Take 1 tablet (150 mg) by mouth daily for 10 days, followed by 150mg once weekly for 6 months  Dispense: 34 tablet; Refill: 0    2. Ccontraceptive management  Attempted Mirena IUD insertion. Plan to return while on period for another attempt. Otherwise plan to start microgestin birth control pills  - Colposcopy[facility]Gynecology Clinic-[facility] INTERNAL REFERRAL  Can schedule IUD during Tuesday PM GYN clinic or on Dr. Cullen's clinic schedule    Thank you for coming to [facility] today.  Lab Testing:  **If you had lab testing today and your results are reassuring or normal they will be mailed to you or sent through [facility] within 7 days.   **If the lab tests need quick action we will call you with the results.  The phone number we will call with results is # [phone] (home) . If this is not the best number please call our clinic and change the number.  Medication Refills:  If you need any refills please call your pharmacy and they will contact us.   If you need to  your refill at a new pharmacy, please contact the new pharmacy directly. The new pharmacy will help you get your medications transferred faster.   Scheduling:  If you have any concerns about today's visit or wish to schedule another appointment please call our office during normal business hours [phone] (8-5:00 M-F)  If a referral was  made to a [facility] and you don't get a call from central scheduling please call [phone].  If a Mammogram was ordered for you at [facility] call [phone] to schedule or change your appointment.  If you had an XRay/CT/Ultrasound/MRI ordered the number is [phone] to schedule or change your radiology appointment.   Medical Concerns:  If you have urgent medical concerns please call [phone] at any time of the day.    Nona Cullen MD            Follow-ups after your visit        Additional Services     Colposcopy/Gynecology Clinic-[facility] INTERNAL REFERRAL       What procedure: Mirena IUD insertion while on menses  Urgency of Appointment: next available  Would this patient benefit from pre-medication with Ativan for procedural anxiety? No  Is this patient post-menopausal? No  Ok to schedule as a 40 min visit on [facility] regular clinic schedule.                  Who to contact     Please call your clinic at [phone] to:    Ask questions about your health    Make or cancel appointments    Discuss your medicines    Learn about your test results    Speak to your doctor            Additional Information About Your Visit        [employer] Information     [employer] gives you secure access to your electronic health record. If you see a primary care provider, you can also send messages to your care team and make appointments. If you have questions, please call your primary care clinic.  If you do not have a primary care provider, please call [phone] and they will assist you.      [employer] is an electronic gateway that provides easy, online access to your medical records. With [facility], you can request a clinic appointment, read your test results, renew a prescription or communicate with your care team.     To access your existing account, please contact your [facility] or call [phone] for assistance.        Care EveryWhere ID     This  is your Care EveryWhere ID. This could be used by other organizations to access your [facility] medical records  [id number]        Your Vitals Were     Pulse Respirations Last Period Pulse Oximetry BMI (Body Mass Index)       56 12 05/01/2018 99% 20.3 kg/m2        Blood Pressure from Last 3 Encounters:   05/17/18 115/70   04/02/18 122/73   03/09/18 110/66    Weight from Last 3 Encounters:   05/17/18 125 lb 12.8 oz (57.1 kg)   04/02/18 128 lb 9.6 oz (58.3 kg)   03/09/18 125 lb 9.6 oz (57 kg)              We Performed the Following     Colposcopy/Gynecology Clinic-[facility] INTERNAL REFERRAL     HCG Qualitative Urine (UPT) ([facility])     Wet Prep ([facility])          Today's Medication Changes          These changes are accurate as of 5/17/18  5:04 PM.  If you have any questions, ask your nurse or doctor.               Start taking these medicines.        Dose/Directions    fluconazole 150 MG tablet   Commonly known as:  DIFLUCAN   Used for:  Recurrent candidiasis of vagina   Started by:  Nona Cullen MD        Dose:  150 mg   Take 1 tablet (150 mg) by mouth daily for 10 days, followed by 150mg once weekly for 6 months   Quantity:  34 tablet   Refills:  0            Where to get your medicines      These medications were sent to [facility] [place], [place] - [address] [facility] [place] [address]     Phone:  [phone]     fluconazole 150 MG tablet                Primary Care Provider Office Phone # Fax #    Nona Cullen [phone]       [address] [facility] [address]        Equal Access to Services     JEAN-PAUL MARTINEZ AH: Hadii keanu Seals, waaxda luqadaha, qaybta kaalmada lydia roth. So [facility] [phone].    ATENCIÓN: Si habla español, tiene a whyte disposición servicios gratuitos de asistencia lingüística. Llame al [phone].    We comply with applicable federal civil rights laws and [place] laws.  We do not discriminate on the basis of race, color, national origin, age, disability, sex, sexual orientation, or gender identity.            Thank you!     Thank you for choosing [facility]  for your care. Our goal is always to provide you with excellent care. Hearing back from our patients is one way we can continue to improve our services. Please take a few minutes to complete the written survey that you may receive in the mail after your visit with us. Thank you!             Your Updated Medication List - Protect others around you: Learn how to safely use, store and throw away your medicines at www.disposemymeds.org.          This list is accurate as of 5/17/18  5:04 PM.  Always use your most recent med list.                   Brand Name Dispense Instructions for use Diagnosis    acetaminophen 500 MG tablet    TYLENOL     Take 1,000 mg by mouth        amphetamine-dextroamphetamine 5 MG per tablet    ADDERALL     Take 5 mg by mouth        fluconazole 150 MG tablet    DIFLUCAN    34 tablet    Take 1 tablet (150 mg) by mouth daily for 10 days, followed by 150mg once weekly for 6 months    Recurrent candidiasis of vagina       hydrOXYzine 25 MG tablet    ATARAX    30 tablet    Take 1 tablet (25 mg) by mouth every 6 hours as needed for anxiety    JASON (generalized anxiety disorder)       ibuprofen 600 MG tablet    ADVIL/MOTRIN    60 tablet    Take 1 tablet (600 mg) by mouth every 6 hours    Dysmenorrhea       IRON PO      Take 1 tablet by mouth        norethindrone-ethinyl estradiol-iron 1.5-30 MG-MCG per tablet    MICROGESTIN FE1.5/30    84 tablet    Take 1 tablet by mouth daily    Menorrhagia with regular cycle       propranolol 40 MG tablet    INDERAL    30 tablet    Take 1 tablet (40 mg) by mouth daily 60-90 minutes prior to stress-inducing event    JASON (generalized anxiety disorder)       ranitidine 300 MG tablet    ZANTAC    30 tablet    Take 0.5 tablets (150 mg) by mouth 2 times daily     Gastroesophageal reflux disease, esophagitis presence not specified

## 2018-07-02 ENCOUNTER — APPOINTMENT (OUTPATIENT)
Dept: GENERAL RADIOLOGY | Facility: CLINIC | Age: 28
End: 2018-07-02
Attending: EMERGENCY MEDICINE
Payer: COMMERCIAL

## 2018-07-02 ENCOUNTER — HOSPITAL ENCOUNTER (EMERGENCY)
Facility: CLINIC | Age: 28
Discharge: HOME | End: 2018-07-02
Attending: EMERGENCY MEDICINE | Admitting: EMERGENCY MEDICINE
Payer: COMMERCIAL

## 2018-07-02 VITALS
HEART RATE: 88 BPM | DIASTOLIC BLOOD PRESSURE: 77 MMHG | OXYGEN SATURATION: 98 % | HEIGHT: 66 IN | TEMPERATURE: 98.9 F | RESPIRATION RATE: 16 BRPM | SYSTOLIC BLOOD PRESSURE: 98 MMHG

## 2018-07-02 DIAGNOSIS — S40.012A CONTUSION OF LEFT SCAPULA: ICD-10-CM

## 2018-07-02 DIAGNOSIS — S70.02XA HEMATOMA OF LEFT HIP: ICD-10-CM

## 2018-07-02 DIAGNOSIS — T07.XXXA CONTUSION, MULTIPLE SITES: ICD-10-CM

## 2018-07-02 PROCEDURE — 72170 X-RAY EXAM OF PELVIS: CPT

## 2018-07-02 PROCEDURE — 71046 X-RAY EXAM CHEST 2 VIEWS: CPT

## 2018-07-02 PROCEDURE — 99284 EMERGENCY DEPT VISIT MOD MDM: CPT | Mod: Z6 | Performed by: EMERGENCY MEDICINE

## 2018-07-02 PROCEDURE — 99284 EMERGENCY DEPT VISIT MOD MDM: CPT | Mod: 25

## 2018-07-02 RX ORDER — IBUPROFEN 200 MG
600 TABLET ORAL 3 TIMES DAILY
Qty: 45 TABLET | Refills: 0 | Status: SHIPPED | OUTPATIENT
Start: 2018-07-02 | End: 2018-07-07

## 2018-07-02 ASSESSMENT — ENCOUNTER SYMPTOMS
SHORTNESS OF BREATH: 0
FEVER: 0
ARTHRALGIAS: 1
ABDOMINAL PAIN: 0

## 2018-07-02 NOTE — ED AVS SNAPSHOT
[facility], Emergency Department    [address]    [place] [address]    Phone:  [phone]    Fax:  [phone]                                       Tiara Andrade   MRN: [id number]    Department:  [facility], Emergency Department   Date of Visit:  7/2/2018           Patient Information     Date Of Birth          1990        Your diagnoses for this visit were:     Contusion, multiple sites        You were seen by Ced Boudreaux MD.        Discharge Instructions       Please make an appointment to follow up with Your Primary Care Provider or our Primary Care Center (phone: [phone] in 5-7 days unless symptoms completely resolve.    Discharge References/Attachments     SOFT TISSUE CONTUSION (ENGLISH)      24 Hour Appointment Hotline       To make an appointment at any [facility] clinic, call [phone] ([phone]). If you don't have a family doctor or clinic, we will help you find one. [facility] clinics are conveniently located to serve the needs of you and your family.             Review of your medicines      CONTINUE these medicines which may have CHANGED, or have new prescriptions. If we are uncertain of the size of tablets/capsules you have at home, strength may be listed as something that might have changed.        Dose / Directions Last dose taken    ibuprofen 200 MG tablet   Commonly known as:  ADVIL/MOTRIN   Dose:  600 mg   What changed:    - medication strength  - when to take this   Quantity:  45 tablet        Take 3 tablets (600 mg) by mouth 3 times daily for 5 days   Refills:  0          Our records show that you are taking the medicines listed below. If these are incorrect, please call your family doctor or clinic.        Dose / Directions Last dose taken    acetaminophen 500 MG tablet   Commonly known as:  TYLENOL   Dose:  1000 mg        Take 1,000 mg by mouth   Refills:  0        amphetamine-dextroamphetamine 5 MG per tablet   Commonly known as:  ADDERALL    Dose:  5 mg        Take 5 mg by mouth   Refills:  0        fluconazole 150 MG tablet   Commonly known as:  DIFLUCAN   Dose:  150 mg   Quantity:  34 tablet        Take 1 tablet (150 mg) by mouth daily for 10 days, followed by 150mg once weekly for 6 months   Refills:  0        hydrOXYzine 25 MG tablet   Commonly known as:  ATARAX   Dose:  25 mg   Quantity:  30 tablet        Take 1 tablet (25 mg) by mouth every 6 hours as needed for anxiety   Refills:  11        IRON PO   Dose:  1 tablet        Take 1 tablet by mouth   Refills:  0        norethindrone-ethinyl estradiol-iron 1.5-30 MG-MCG per tablet   Commonly known as:  MICROGESTIN FE1.5/30   Dose:  1 tablet   Quantity:  84 tablet        Take 1 tablet by mouth daily   Refills:  3        propranolol 40 MG tablet   Commonly known as:  INDERAL   Dose:  40 mg   Quantity:  30 tablet        Take 1 tablet (40 mg) by mouth daily 60-90 minutes prior to stress-inducing event   Refills:  3        ranitidine 300 MG tablet   Commonly known as:  ZANTAC   Dose:  150 mg   Quantity:  30 tablet        Take 0.5 tablets (150 mg) by mouth 2 times daily   Refills:  1                Prescriptions were sent or printed at these locations (1 Prescription)                   Other Prescriptions                Printed at Department/Unit printer (1 of 1)         ibuprofen (ADVIL/MOTRIN) 200 MG tablet                Procedures and tests performed during your visit     Chest XR,  PA & LAT    XR Pelvis 1/2 Views      Orders Needing Specimen Collection     None      Pending Results     No orders found from 6/30/2018 to 7/3/2018.            Pending Culture Results     No orders found from 6/30/2018 to 7/3/2018.            Pending Results Instructions     If you had any lab results that were not finalized at the time of your Discharge, you can call the ED Lab Result RN at [phone]. You will be contacted by this team for any positive Lab results or changes in treatment. The nurses are available 7  days a week from 10A to 6:30P.  You can leave a message 24 hours per day and they will return your call.        Thank you for choosing [facility]       Thank you for choosing [facility] for your care. Our goal is always to provide you with excellent care. Hearing back from our patients is one way we can continue to improve our services. Please take a few minutes to complete the written survey that you may receive in the mail after you visit with us. Thank you!        [facility] Information     [facility] gives you secure access to your electronic health record. If you see a primary care provider, you can also send messages to your care team and make appointments. If you have questions, please call your primary care clinic.  If you do not have a primary care provider, please call [phone] and they will assist you.        Care EveryWhere ID     This is your Care EveryWhere ID. This could be used by other organizations to access your [facility] medical records  [id number]        Equal Access to Services     ABRAHAM MARITNEZ : Vilma Seals, jaylin yin, marquise roth, lydia hinds . So [facility] [phone].    ATENCIÓN: Si habla español, tiene a whyte disposición servicios gratuitos de asistencia lingüística. Llame al [phone].    We comply with applicable federal civil rights laws and [place] laws. We do not discriminate on the basis of race, color, national origin, age, disability, sex, sexual orientation, or gender identity.            After Visit Summary       This is your record. Keep this with you and show to your community pharmacist(s) and doctor(s) at your next visit.

## 2018-07-02 NOTE — ED AVS SNAPSHOT
[facility], [place], Emergency Department    [address]    [facility] [address]    Phone:  [phone]    Fax:  [phone]                                       Tiara Andrade   MRN: [id number]    Department:  [facility], Emergency Department   Date of Visit:  7/2/2018           After Visit Summary Signature Page     I have received my discharge instructions, and my questions have been answered. I have discussed any challenges I see with this plan with the nurse or doctor.    ..........................................................................................................................................  Patient/Patient Representative Signature      ..........................................................................................................................................  Patient Representative Print Name and Relationship to Patient    ..................................................               ................................................  Date                                            Time    ..........................................................................................................................................  Reviewed by Signature/Title    ...................................................              ..............................................  Date                                                            Time
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
## 2018-07-03 NOTE — DISCHARGE INSTRUCTIONS
Please make an appointment to follow up with Your Primary Care Provider or our Primary Care Center (phone: [phone] in 5-7 days unless symptoms completely resolve.

## 2018-07-21 ENCOUNTER — APPOINTMENT (OUTPATIENT)
Dept: GENERAL RADIOLOGY | Facility: CLINIC | Age: 28
End: 2018-07-21
Attending: FAMILY MEDICINE
Payer: COMMERCIAL

## 2018-07-21 ENCOUNTER — HOSPITAL ENCOUNTER (EMERGENCY)
Facility: CLINIC | Age: 28
Discharge: HOME | End: 2018-07-21
Attending: FAMILY MEDICINE | Admitting: FAMILY MEDICINE
Payer: COMMERCIAL

## 2018-07-21 VITALS
RESPIRATION RATE: 20 BRPM | SYSTOLIC BLOOD PRESSURE: 115 MMHG | TEMPERATURE: 98.2 F | DIASTOLIC BLOOD PRESSURE: 77 MMHG | HEART RATE: 64 BPM | OXYGEN SATURATION: 99 %

## 2018-07-21 DIAGNOSIS — J06.9 VIRAL UPPER RESPIRATORY TRACT INFECTION: ICD-10-CM

## 2018-07-21 PROCEDURE — 71046 X-RAY EXAM CHEST 2 VIEWS: CPT

## 2018-07-21 PROCEDURE — 99283 EMERGENCY DEPT VISIT LOW MDM: CPT | Mod: Z6 | Performed by: FAMILY MEDICINE

## 2018-07-21 PROCEDURE — 99283 EMERGENCY DEPT VISIT LOW MDM: CPT | Mod: 25

## 2018-07-21 NOTE — ED AVS SNAPSHOT
[facility], [place], Emergency Department    [address]    [facility] [address]    Phone:  [phone]    Fax:  [phone]                                       Tiara Andrade   MRN: [id number]    Department:  [facility], Emergency Department   Date of Visit:  7/21/2018           After Visit Summary Signature Page     I have received my discharge instructions, and my questions have been answered. I have discussed any challenges I see with this plan with the nurse or doctor.    ..........................................................................................................................................  Patient/Patient Representative Signature      ..........................................................................................................................................  Patient Representative Print Name and Relationship to Patient    ..................................................               ................................................  Date                                            Time    ..........................................................................................................................................  Reviewed by Signature/Title    ...................................................              ..............................................  Date                                                            Time

## 2018-07-21 NOTE — ED AVS SNAPSHOT
" [facility], Emergency Department    [address]    [place] [address]    Phone:  [phone]    Fax:  [phone]                                       Tiara Andrade   MRN: [id number]    Department:  [facility], Emergency Department   Date of Visit:  7/21/2018           Patient Information     Date Of Birth          1990        Your diagnoses for this visit were:     Viral upper respiratory tract infection        You were seen by Rick Rodriguez MD.        Discharge Instructions       You do not have a pneumonia- your lungs on exam and on xray are fine.  Get Robitussin DM and use 2 teaspoons every 4 hours especially at bed time  If needed 2 advil every 4 hours for the aches and pains and \"fevers\"  If not greatly better by Wednesday, see your clinic for follow up    This is a viral infection  Antibiotics will not help at this time- if this persists, antibiotics may help  Return to the emergency room if short of air or fevers > 101    24 Hour Appointment Hotline       To make an appointment at any [facility] clinic, call [phone] ([phone]). If you don't have a family doctor or clinic, we will help you find one. [facility] clinics are conveniently located to serve the needs of you and your family.             Review of your medicines      Our records show that you are taking the medicines listed below. If these are incorrect, please call your family doctor or clinic.        Dose / Directions Last dose taken    hydrOXYzine 25 MG tablet   Commonly known as:  ATARAX   Dose:  25 mg   Quantity:  30 tablet        Take 1 tablet (25 mg) by mouth every 6 hours as needed for anxiety   Refills:  11        norethindrone-ethinyl estradiol-iron 1.5-30 MG-MCG per tablet   Commonly known as:  MICROGESTIN FE1.5/30   Dose:  1 tablet   Quantity:  84 tablet        Take 1 tablet by mouth daily   Refills:  3        propranolol 40 MG tablet   Commonly known as:  INDERAL   Dose:  40 mg   Quantity:  30 tablet        " Take 1 tablet (40 mg) by mouth daily 60-90 minutes prior to stress-inducing event   Refills:  3        ranitidine 300 MG tablet   Commonly known as:  ZANTAC   Dose:  150 mg   Quantity:  30 tablet        Take 0.5 tablets (150 mg) by mouth 2 times daily   Refills:  1                Procedures and tests performed during your visit     Chest XR,  PA & LAT      Orders Needing Specimen Collection     None      Pending Results     Date and Time Order Name Status Description    7/21/2018 1228 Chest XR,  PA & LAT Preliminary             Pending Culture Results     No orders found from 7/19/2018 to 7/22/2018.            Pending Results Instructions     If you had any lab results that were not finalized at the time of your Discharge, you can call the ED Lab Result RN at [phone]. You will be contacted by this team for any positive Lab results or changes in treatment. The nurses are available 7 days a week from 10A to 6:30P.  You can leave a message 24 hours per day and they will return your call.        Thank you for choosing [facility]       Thank you for choosing [facility] for your care. Our goal is always to provide you with excellent care. Hearing back from our patients is one way we can continue to improve our services. Please take a few minutes to complete the written survey that you may receive in the mail after you visit with us. Thank you!        [employer] Information     [employer] gives you secure access to your electronic health record. If you see a primary care provider, you can also send messages to your care team and make appointments. If you have questions, please call your primary care clinic.  If you do not have a primary care provider, please call [phone] and they will assist you.        Care EveryWhere ID     This is your Care EveryWhere ID. This could be used by other organizations to access your [facility] medical records  [id number]        Equal Access to Services     ABRAHAM MARTINEZ AH: Vilma hurtado  jaylin Seals, marquise kapadiamalydia pérez. So [facility] [phone].    ATENCIÓN: Si habla español, tiene a whyte disposición servicios gratuitos de asistencia lingüística. Llame al [phone].    We comply with applicable federal civil rights laws and [place] laws. We do not discriminate on the basis of race, color, national origin, age, disability, sex, sexual orientation, or gender identity.            After Visit Summary       This is your record. Keep this with you and show to your community pharmacist(s) and doctor(s) at your next visit.

## 2018-07-21 NOTE — DISCHARGE INSTRUCTIONS
"You do not have a pneumonia- your lungs on exam and on xray are fine.  Get Robitussin DM and use 2 teaspoons every 4 hours especially at bed time  If needed 2 advil every 4 hours for the aches and pains and \"fevers\"  If not greatly better by Wednesday, see your clinic for follow up    This is a viral infection  Antibiotics will not help at this time- if this persists, antibiotics may help  Return to the emergency room if short of air or fevers > 101  "

## 2018-07-21 NOTE — ED PROVIDER NOTES
History     Chief Complaint   Patient presents with     Cough     cough x 6 days, coughing up white sputum x 3 days, chills x 3 days, night sweats x 2 weeks, chest pain and back pain x 2 days when cough, stuffy nose x 4 days. no recent travel outside country     HPI  Tiara Andrade is a 28 year old female who has had a cough and chilling for 1 week. CP with cough. Rhinorrhea. No soa. No n or v. No documented fever.  No hx of TB.     No ongoing health problems. Except anxiety. Non smoker.  No street drugs  No etoh      I have reviewed the Medications, Allergies, Past Medical and Surgical History, and Social History in the Epic system.    Review of Systems   Constitutional: Positive for chills. Negative for fever.   HENT: Positive for congestion and rhinorrhea. Negative for sore throat.    Respiratory: Positive for cough. Negative for shortness of breath.    Cardiovascular: Positive for chest pain (with coughing).   Gastrointestinal: Negative for nausea and vomiting.   Genitourinary:        Denies preg   Musculoskeletal: Positive for myalgias.   Skin: Negative.    Allergic/Immunologic: Negative for immunocompromised state.   Hematological: Negative.        Physical Exam   BP: 112/67  Pulse: 64  Heart Rate: 66  Temp: 98.2  F (36.8  C)  Resp: 20  SpO2: 99 %      Physical Exam   Constitutional: She is oriented to person, place, and time. She appears well-developed and well-nourished. No distress.   HENT:   Head: Normocephalic and atraumatic.   Mouth/Throat: No oropharyngeal exudate.   Neck: Normal range of motion. Neck supple.   Cardiovascular: Normal rate, regular rhythm, normal heart sounds and intact distal pulses.    No murmur heard.  Pulmonary/Chest: Breath sounds normal. No respiratory distress.   Abdominal: She exhibits no mass. There is no tenderness.   Lymphadenopathy:     She has no cervical adenopathy.   Neurological: She is alert and oriented to person, place, and time.   Skin: Skin is warm and dry. She is not  diaphoretic.   Nursing note and vitals reviewed.      ED Course     ED Course     Procedures        The pt would seem to have an URI. CXR is negative for infiltrates.  Likely viral. Will treat as such  Labs Ordered and Resulted from Time of ED Arrival Up to the Time of Departure from the ED - No data to display         Assessments & Plan (with Medical Decision Making)       I have reviewed the nursing notes.    I have reviewed the findings, diagnosis, plan and need for follow up with the patient.    Discharge Medication List as of 7/21/2018  2:03 PM          Final diagnoses:   Viral upper respiratory tract infection       7/21/2018   [facility], [place], EMERGENCY DEPARTMENT     Rick Rodriguez MD  07/28/18 4951

## 2018-07-28 ASSESSMENT — ENCOUNTER SYMPTOMS
SHORTNESS OF BREATH: 0
NAUSEA: 0
COUGH: 1
MYALGIAS: 1
CHILLS: 1
HEMATOLOGIC/LYMPHATIC NEGATIVE: 1
RHINORRHEA: 1
VOMITING: 0
SORE THROAT: 0
FEVER: 0

## 2018-11-20 ENCOUNTER — OFFICE VISIT (OUTPATIENT)
Dept: FAMILY MEDICINE | Facility: CLINIC | Age: 28
End: 2018-11-20
Payer: COMMERCIAL

## 2018-11-20 VITALS
DIASTOLIC BLOOD PRESSURE: 70 MMHG | SYSTOLIC BLOOD PRESSURE: 108 MMHG | HEART RATE: 67 BPM | TEMPERATURE: 98.5 F | WEIGHT: 132.2 LBS | BODY MASS INDEX: 21.34 KG/M2 | OXYGEN SATURATION: 100 %

## 2018-11-20 DIAGNOSIS — Z30.012 EMERGENCY CONTRACEPTION: Primary | ICD-10-CM

## 2018-11-20 DIAGNOSIS — F41.1 GAD (GENERALIZED ANXIETY DISORDER): ICD-10-CM

## 2018-11-20 RX ORDER — PROPRANOLOL HYDROCHLORIDE 40 MG/1
40 TABLET ORAL DAILY
Qty: 30 TABLET | Refills: 11 | Status: SHIPPED | OUTPATIENT
Start: 2018-11-20 | End: 2019-05-07

## 2018-11-20 RX ORDER — HYDROXYZINE HYDROCHLORIDE 25 MG/1
25 TABLET, FILM COATED ORAL EVERY 6 HOURS PRN
Qty: 30 TABLET | Refills: 11 | Status: SHIPPED | OUTPATIENT
Start: 2018-11-20 | End: 2019-05-07

## 2018-11-20 NOTE — MR AVS SNAPSHOT
After Visit Summary   11/20/2018    Tiara Andrade    MRN: [id number]           Patient Information     Date Of Birth          1990        Visit Information        Provider Department      11/20/2018 11:00 AM Nona Cullen MD \[facility]\"" [facility]        Today's Diagnoses     Emergency contraception    -  1    JASON (generalized anxiety disorder)          Care Instructions    Generalized Anxiety Disorder  - Schedule therapy at \[facility]\"" or Consider therapy billie such as [facility]  - Continue hydroxyzine and propranolol as needed    - propranolol (INDERAL) 40 MG tablet; Take 1 tablet (40 mg) by mouth daily 60-90 minutes prior to stress-inducing event  Dispense: 30 tablet; Refill: 11  - hydrOXYzine (ATARAX) 25 MG tablet; Take 1 tablet (25 mg) by mouth every 6 hours as needed for anxiety  Dispense: 30 tablet; Refill: 11    Viral URI with cough  Nyquil  Anything with sudafed to help dry up the congestion and mucous  Drink lots of fluids    Upper respiratory infections are usually caused by viruses and, sometimes certain bacteria.  Antibiotics don't help the vast majority of people recover any quicker even when caused by a bacteria.  The body will fight this infection but it needs to be treated well in order to help heal itself.  Rest as needed.  It is ok to reduce food intake if appetite is poor but it is quite important to maintain/increase fluid intake.    For cough, dextromethorphan/guaifenesin combinations help loosen secretions and suppress cough safely without significant risk of sedation.     For nasal congestion and sinus pressure, pseudoephedrine (Sudafed) or phenylephrine is often helpful but it can cause elevations in blood pressure and insomnia.  Short courses of a nasal decongestant spray (Afrin or Neosinephrine) can be appropriate but their use should be restricted to 3 days due to the high risk of nasal addiction.    Emergency contraception  - Take within 3-5 days of unprotected  sex, works better the sooner it's taken.   - Ulipristal Acetate (KARLEE) 30 MG tablet; Take 1 tablet (30 mg) by mouth once for 1 dose  Dispense: 1 tablet; Refill: 3    Thank you for coming to [facility] today.  Lab Testing:  **If you had lab testing today and your results are reassuring or normal they will be mailed to you or sent through [employer] within 7 days.   **If the lab tests need quick action we will call you with the results.  The phone number we will call with results is # [phone] (home) . If this is not the best number please call our clinic and change the number.  Medication Refills:  If you need any refills please call your pharmacy and they will contact us.   If you need to  your refill at a new pharmacy, please contact the new pharmacy directly. The new pharmacy will help you get your medications transferred faster.   Scheduling:  If you have any concerns about today's visit or wish to schedule another appointment please call our office during normal business hours [phone] (8-5:00 M-F)  If a referral was made to a [facility] and you don't get a call from central scheduling please call [phone].  If a Mammogram was ordered for you at [facility] call [phone] to schedule or change your appointment.  If you had an XRay/CT/Ultrasound/MRI ordered the number is [phone] to schedule or change your radiology appointment.   Medical Concerns:  If you have urgent medical concerns please call [phone] at any time of the day.    Nona Cullen MD              Follow-ups after your visit        Who to contact     Please call your clinic at [phone] to:    Ask questions about your health    Make or cancel appointments    Discuss your medicines    Learn about your test results    Speak to your doctor            Additional Information About Your Visit        [employer] Information     [employer] gives you secure access to your electronic health  record. If you see a primary care provider, you can also send messages to your care team and make appointments. If you have questions, please call your primary care clinic.  If you do not have a primary care provider, please call [phone] and they will assist you.      [employer] is an electronic gateway that provides easy, online access to your medical records. With [employer], you can request a clinic appointment, read your test results, renew a prescription or communicate with your care team.     To access your existing account, please contact your [facility] or call [phone] for assistance.        Care EveryWhere ID     This is your Care EveryWhere ID. This could be used by other organizations to access your [place] medical records  [id number]        Your Vitals Were     Pulse Temperature Last Period Pulse Oximetry BMI (Body Mass Index)       67 98.5  F (36.9  C) (Oral) 11/01/2018 (Exact Date) 100% 21.34 kg/m2        Blood Pressure from Last 3 Encounters:   11/20/18 108/70   07/21/18 115/77   07/02/18 98/77    Weight from Last 3 Encounters:   11/20/18 132 lb 3.2 oz (60 kg)   05/17/18 125 lb 12.8 oz (57.1 kg)   04/02/18 128 lb 9.6 oz (58.3 kg)              Today, you had the following     No orders found for display         Today's Medication Changes          These changes are accurate as of 11/20/18 12:12 PM.  If you have any questions, ask your nurse or doctor.               Start taking these medicines.        Dose/Directions    Ulipristal Acetate 30 MG tablet   Commonly known as:  KARLEE   Used for:  Emergency contraception   Started by:  Nona Cullen MD        Dose:  30 mg   Take 1 tablet (30 mg) by mouth once for 1 dose   Quantity:  1 tablet   Refills:  3            Where to get your medicines      These medications were sent to [facility] [place], [place] - [address] [facility] [address]     Phone:  [phone]     Timo  25 MG tablet    propranolol 40 MG tablet    Ulipristal Acetate 30 MG tablet                Primary Care Provider Office Phone # Fax #    Nona Ezequiel Cullen [phone]       [address] [facility] [address]        Equal Access to Services     ABRAHAM MARTINEZ : Hadii keanu watson angelao Soomaali, waaxda luqadaha, qaybta kaalmada adeoscar, lydia woodruff jonathandani zhao laNadyalillie bonilla. So [facility] [phone].    ATENCIÓN: Si habla español, tiene a whyte disposición servicios gratuitos de asistencia lingüística. [facility] [phone].    We comply with applicable federal civil rights laws and [place] laws. We do not discriminate on the basis of race, color, national origin, age, disability, sex, sexual orientation, or gender identity.            Thank you!     Thank you for choosing [facility]  for your care. Our goal is always to provide you with excellent care. Hearing back from our patients is one way we can continue to improve our services. Please take a few minutes to complete the written survey that you may receive in the mail after your visit with us. Thank you!             Your Updated Medication List - Protect others around you: Learn how to safely use, store and throw away your medicines at www.disposemymeds.org.          This list is accurate as of 11/20/18 12:12 PM.  Always use your most recent med list.                   Brand Name Dispense Instructions for use Diagnosis    hydrOXYzine 25 MG tablet    ATARAX    30 tablet    Take 1 tablet (25 mg) by mouth every 6 hours as needed for anxiety    JASON (generalized anxiety disorder)       propranolol 40 MG tablet    INDERAL    30 tablet    Take 1 tablet (40 mg) by mouth daily 60-90 minutes prior to stress-inducing event    JASON (generalized anxiety disorder)       ranitidine 300 MG tablet    ZANTAC    30 tablet    Take 0.5 tablets (150 mg) by mouth 2 times daily    Gastroesophageal reflux disease, esophagitis presence not specified        Ulipristal Acetate 30 MG tablet    KARLEE    1 tablet    Take 1 tablet (30 mg) by mouth once for 1 dose    Emergency contraception

## 2018-11-20 NOTE — PATIENT INSTRUCTIONS
Generalized Anxiety Disorder  - Schedule therapy at [facility] or Consider therapy billie such as [facility]  - Continue hydroxyzine and propranolol as needed    - propranolol (INDERAL) 40 MG tablet; Take 1 tablet (40 mg) by mouth daily 60-90 minutes prior to stress-inducing event  Dispense: 30 tablet; Refill: 11  - hydrOXYzine (ATARAX) 25 MG tablet; Take 1 tablet (25 mg) by mouth every 6 hours as needed for anxiety  Dispense: 30 tablet; Refill: 11    Viral URI with cough  Nyquil  Anything with sudafed to help dry up the congestion and mucous  Drink lots of fluids    Upper respiratory infections are usually caused by viruses and, sometimes certain bacteria.  Antibiotics don't help the vast majority of people recover any quicker even when caused by a bacteria.  The body will fight this infection but it needs to be treated well in order to help heal itself.  Rest as needed.  It is ok to reduce food intake if appetite is poor but it is quite important to maintain/increase fluid intake.    For cough, dextromethorphan/guaifenesin combinations help loosen secretions and suppress cough safely without significant risk of sedation.     For nasal congestion and sinus pressure, pseudoephedrine (Sudafed) or phenylephrine is often helpful but it can cause elevations in blood pressure and insomnia.  Short courses of a nasal decongestant spray (Afrin or Neosinephrine) can be appropriate but their use should be restricted to 3 days due to the high risk of nasal addiction.    Emergency contraception  - Take within 3-5 days of unprotected sex, works better the sooner it's taken.   - Ulipristal Acetate (KARLEE) 30 MG tablet; Take 1 tablet (30 mg) by mouth once for 1 dose  Dispense: 1 tablet; Refill: 3    Thank you for coming to [facility] today.  Lab Testing:  **If you had lab testing today and your results are reassuring or normal they will be mailed to you or sent through [facility] within 7 days.   **If the lab tests need quick action we  will call you with the results.  The phone number we will call with results is # [phone] (home) . If this is not the best number please call our clinic and change the number.  Medication Refills:  If you need any refills please call your pharmacy and they will contact us.   If you need to  your refill at a new pharmacy, please contact the new pharmacy directly. The new pharmacy will help you get your medications transferred faster.   Scheduling:  If you have any concerns about today's visit or wish to schedule another appointment please call our office during normal business hours [phone] (8-5:00 M-F)  If a referral was made to a [facility] and you don't get a call from central scheduling please call [phone].  If a Mammogram was ordered for you at [facility] call [phone] to schedule or change your appointment.  If you had an XRay/CT/Ultrasound/MRI ordered the number is [phone] to schedule or change your radiology appointment.   Medical Concerns:  If you have urgent medical concerns please call [phone] at any time of the day.    Nona Cullen MD

## 2018-11-20 NOTE — PROGRESS NOTES
MEHRAN Andrade is a 28 year old  who presents for   Chief Complaint   Patient presents with     Cough     x4 weeks     Refill Request     hydroxyzine, propanolol       Acute Illness   Concerns: cough x 3 weeks  Nasal congestion x 3 days at night, very dry thoat at night.   Is it getting better, worse or staying the same? better    Fatigue/Achiness?:No     Fever?: No     Chills/Sweats?: No     Headache (location?)No     Sinus Pressure?: YES     Eye redness/Discharge?: No     Ear Pain?:  YES left side    Runny nose?: No     Congestion?:  YES     Sore Throat?:  YES   Respiratory    Cough?:  YES-non-productive    Wheeze?: No   GI/    Decreased Appetite?: No     Nausea?:No     Vomiting?: No     Diarrhea?:  No     Dysuria/Frequency?.:No       Any Illness Exposure?: No     Any foreign travel or contact with anyone ill who travelled abroad? No     Therapies Tried and outcome: Nothing      Anxiety  Hasn't been using her PRN Hydroxyzine much, once per week.   Uses propranolol 40mg every time heart starts racing, about twice per week.   Not driving, job 6 minutes aawy  Lately anxiety has worsened again because stressed from work, grandma  Still not interested in an every day med, only PRNs  Interested in therapy      Contraception  Not having sex right now, stopped her OCPs.     +++++++      Problem, Medication and Allergy Lists were reviewed and updated if needed..    Patient is an established patient of this clinic..         Review of Systems:   Review of Systems         Physical Exam:     Vitals:    11/20/18 1119   BP: 108/70   Pulse: 67   Temp: 98.5  F (36.9  C)   TempSrc: Oral   SpO2: 100%   Weight: 132 lb 3.2 oz (60 kg)     Body mass index is 21.34 kg/(m^2).     Physical Exam   Constitutional: She appears well-developed and well-nourished. No distress.   HENT:   Head: Normocephalic and atraumatic.   Right Ear: Tympanic membrane, external ear and ear canal normal.   Left Ear: Tympanic membrane, external ear  and ear canal normal.   Nose: Mucosal edema and rhinorrhea present. No sinus tenderness. Right sinus exhibits no maxillary sinus tenderness and no frontal sinus tenderness. Left sinus exhibits no maxillary sinus tenderness and no frontal sinus tenderness.   Mouth/Throat: Uvula is midline and mucous membranes are normal. Posterior oropharyngeal edema and posterior oropharyngeal erythema present.   Eyes: Conjunctivae are normal.   Neck: Normal range of motion. Neck supple. No thyromegaly present.   Cardiovascular: Normal rate, regular rhythm and normal heart sounds.    No murmur heard.  Pulmonary/Chest: Effort normal and breath sounds normal. No respiratory distress. She has no wheezes.   Musculoskeletal: Normal range of motion.   Lymphadenopathy:     She has no cervical adenopathy.   Neurological: She is alert.   Skin: Skin is warm and dry. No rash noted. She is not diaphoretic. No erythema. No pallor.   Psychiatric: Her behavior is normal. Judgment and thought content normal. Her mood appears anxious.   Vitals reviewed.        Results:   No testing ordered today    Assessment and Plan        Patient Instructions   Generalized Anxiety Disorder  - Schedule therapy at \[facility]\"" or Consider therapy billie such as [facility]  - Continue hydroxyzine and propranolol as needed    - propranolol (INDERAL) 40 MG tablet; Take 1 tablet (40 mg) by mouth daily 60-90 minutes prior to stress-inducing event  Dispense: 30 tablet; Refill: 11  - hydrOXYzine (ATARAX) 25 MG tablet; Take 1 tablet (25 mg) by mouth every 6 hours as needed for anxiety  Dispense: 30 tablet; Refill: 11    Viral URI with cough  Nyquil or  Anything with sudafed to help dry up the congestion and mucous  afrin (no more than 3 days)  Drink lots of fluids      Emergency contraception  - Take within 3-5 days of unprotected sex, works better the sooner it's taken.   - Ulipristal Acetate (KARLEE) 30 MG tablet; Take 1 tablet (30 mg) by mouth once for 1 dose  Dispense: 1 tablet;  Refill: 3         Medications Discontinued During This Encounter   Medication Reason     propranolol (INDERAL) 40 MG tablet Reorder     hydrOXYzine (ATARAX) 25 MG tablet Reorder     norethindrone-ethinyl estradiol-iron (MICROGESTIN FE1.5/30) 1.5-30 MG-MCG per tablet Stopped by Patient       Options for treatment and follow-up care were reviewed with the patient. Tiara Andrade  engaged in the decision making process and verbalized understanding of the options discussed and agreed with the final plan.    I spent 25 min face to face with the patient and >50% was spent counselling the patient about the above medical conditions, educating patient and discussing the recommendations and followup .    MD JEREMY Malin of [facility]

## 2019-05-07 ENCOUNTER — OFFICE VISIT (OUTPATIENT)
Dept: FAMILY MEDICINE | Facility: CLINIC | Age: 29
End: 2019-05-07
Payer: COMMERCIAL

## 2019-05-07 VITALS
OXYGEN SATURATION: 97 % | TEMPERATURE: 98.3 F | WEIGHT: 128 LBS | SYSTOLIC BLOOD PRESSURE: 98 MMHG | HEART RATE: 55 BPM | RESPIRATION RATE: 18 BRPM | BODY MASS INDEX: 20.66 KG/M2 | DIASTOLIC BLOOD PRESSURE: 59 MMHG

## 2019-05-07 DIAGNOSIS — Z71.6 ENCOUNTER FOR SMOKING CESSATION COUNSELING: ICD-10-CM

## 2019-05-07 DIAGNOSIS — R05.8 POST-VIRAL COUGH SYNDROME: ICD-10-CM

## 2019-05-07 DIAGNOSIS — F41.1 GAD (GENERALIZED ANXIETY DISORDER): ICD-10-CM

## 2019-05-07 DIAGNOSIS — Z00.00 ROUTINE GENERAL MEDICAL EXAMINATION AT A HEALTH CARE FACILITY: Primary | ICD-10-CM

## 2019-05-07 DIAGNOSIS — K21.9 GASTROESOPHAGEAL REFLUX DISEASE, ESOPHAGITIS PRESENCE NOT SPECIFIED: ICD-10-CM

## 2019-05-07 RX ORDER — NICOTINE 21 MG/24HR
1 PATCH, TRANSDERMAL 24 HOURS TRANSDERMAL EVERY 24 HOURS
Qty: 42 PATCH | Refills: 0 | Status: SHIPPED | OUTPATIENT
Start: 2019-05-07 | End: 2019-06-17

## 2019-05-07 RX ORDER — PROPRANOLOL HYDROCHLORIDE 40 MG/1
40 TABLET ORAL DAILY
Qty: 30 TABLET | Refills: 11 | Status: SHIPPED | OUTPATIENT
Start: 2019-05-07 | End: 2019-10-19

## 2019-05-07 RX ORDER — HYDROXYZINE HYDROCHLORIDE 25 MG/1
25 TABLET, FILM COATED ORAL EVERY 6 HOURS PRN
Qty: 30 TABLET | Refills: 11 | Status: SHIPPED | OUTPATIENT
Start: 2019-05-07 | End: 2019-10-19

## 2019-05-07 NOTE — PROGRESS NOTES
Preceptor Attestation:   Patient seen, evaluated and discussed with the resident. I have verified the content of the note, which accurately reflects my assessment of the patient and the plan of care.   Supervising Physician:  Bryan Cano MD

## 2019-05-07 NOTE — PROGRESS NOTES
Female Physical Note          HPI         Concerns today:     Cough: 3 weeks, every day. At the start had a fever, and maybe some congestion, but that resolved. Bringing up small amount of white sputum. Raw throat after coughing. Lightheaded with coughing. Eating and drinking okay. No wheezing.     Current smoker: 10 cigarettes a day for 10 years. Helps with stress.     Patient Active Problem List   Diagnosis     JASON (generalized anxiety disorder)     Dysmenorrhea     Dyspareunia in female       Past Medical History:   Diagnosis Date     ADHD      Anemia      Anxiety        Previous Medical Care   [place], PCP: Dr. Cullen     Family History   Problem Relation Age of Onset     Diabetes Maternal Grandmother      Hypertension Maternal Grandmother      Coronary Artery Disease Other      Hyperlipidemia No family hx of      Breast Cancer No family hx of      Cerebrovascular Disease No family hx of      Colon Cancer No family hx of      Prostate Cancer No family hx of      Other Cancer No family hx of      Anxiety Disorder No family hx of      Depression No family hx of      Mental Illness No family hx of      Substance Abuse No family hx of               Review of Systems:     Review of Systems:  CONSTITUTIONAL: NEGATIVE for fever, chills, change in weight  INTEGUMENTARY/SKIN: NEGATIVE for worrisome rashes, moles or lesions  EYES: NEGATIVE for vision changes or irritation  ENT/MOUTH: NEGATIVE for ear, mouth. Positive for throat rawness  RESP: Positive for significant cough. Negative for significant SOB  BREAST: NEGATIVE for masses, tenderness or discharge  CV: NEGATIVE for chest pain, palpitations or peripheral edema  GI: NEGATIVE for nausea, abdominal pain, or change in bowel habits. Positive for heartburn  : NEGATIVE for frequency, dysuria, or hematuria  MUSCULOSKELETAL: NEGATIVE for significant arthralgias or myalgia  NEURO: NEGATIVE for weakness or paresthesias. Positive for dizziness  ENDOCRINE: NEGATIVE for  temperature intolerance, skin/hair changes  HEME/ALLERGY: NEGATIVE for bleeding problems  PSYCHIATRIC: NEGATIVE for changes in mood or affect             Social History     Social History     Socioeconomic History     Marital status: Single     Spouse name: Not on file     Number of children: Not on file     Years of education: Not on file     Highest education level: Not on file   Occupational History     Not on file   Social Needs     Financial resource strain: Not on file     Food insecurity:     Worry: Not on file     Inability: Not on file     Transportation needs:     Medical: Not on file     Non-medical: Not on file   Tobacco Use     Smoking status: Current Every Day Smoker     Packs/day: 0.25     Types: Cigarettes     Smokeless tobacco: Never Used   Substance and Sexual Activity     Alcohol use: No     Drug use: Yes     Types: Marijuana     Comment: once in a while     Sexual activity: Yes     Partners: Male     Birth control/protection: None   Lifestyle     Physical activity:     Days per week: Not on file     Minutes per session: Not on file     Stress: Not on file   Relationships     Social connections:     Talks on phone: Not on file     Gets together: Not on file     Attends Gnosticism service: Not on file     Active member of club or organization: Not on file     Attends meetings of clubs or organizations: Not on file     Relationship status: Not on file     Intimate partner violence:     Fear of current or ex partner: Not on file     Emotionally abused: Not on file     Physically abused: Not on file     Forced sexual activity: Not on file   Other Topics Concern     Not on file   Social History Narrative     Not on file       Marital Status:Partnered  Who lives in your household? grandmother    Sexual Health     Sexual concerns: No   STI History: Neg  Pregnancy History:   LMP Patient's last menstrual period was 2019.   Periods are regular, but pretty painful, to where she throws up. Takes  ibuprofen. Has tried OCPs and considered IUD, but neither worked for her. Currently using condoms for contraception. Does not want to make any changes to this today.    Last Pap Smear Date:   Lab Results   Component Value Date    PAP NIL 07/14/2017     Abnormal Pap History: None    Recommended Screening     No personal or family history of breast cancer         Physical Exam:     Vitals: BP 98/59 (BP Location: Left arm, Patient Position: Chair, Cuff Size: Adult Regular)   Pulse 55   Temp 98.3  F (36.8  C) (Oral)   Resp 18   Wt 58.1 kg (128 lb)   LMP 04/22/2019   SpO2 97%   BMI 20.66 kg/m    BMI= Body mass index is 20.66 kg/m .   GENERAL: healthy, alert and no distress  EYES: Eyes grossly normal to inspection, extraocular movements - intacHENT: ear Mouth- no ulcers, no lesions  NECK: no tenderness, no adenopathy, no asymmetry, no masses, no stiffness; thyroid- normal to palpation  RESP: lungs clear to auscultation - no rales, no rhonchi, no wheezes  CV: regular rates and rhythm, normal S1 S2, no S3 or S4 and no murmur, no click or rub -  ABDOMEN: soft, no tenderness, no  hepatosplenomegaly, no masses, normal bowel sounds  MS: extremities- no gross deformities noted, no edema  SKIN: no suspicious lesions, no rashes  NEURO: strength and tone- normal  PSYCH: Alert and oriented times 3; speech- coherent , normal rate and volume; able to articulate logical thoughts, able to abstract reason, no tangential thoughts, no hallucinations or delusions, affect- normal      Assessment and Plan      Tiara was seen today for physical.    Diagnoses and all orders for this visit:    Encounter for smoking cessation counseling  -     nicotine (NICODERM CQ) 14 MG/24HR 24 hr patch; Place 1 patch onto the skin every 24 hours  -     nicotine (NICORETTE) 2 MG gum; Place 1 each (2 mg) inside cheek as needed for smoking cessation  - return to clinic in 6 weeks to reassess patch dose    Post-viral cough syndrome  Persistent cough starting  after congestion and subjective fever, lungs clear on exam today. Likely slow clearing of cough exacerbated by smoking.   - reassurance and symptomatic treatment as well as smoking cessation as above    Routine general medical examination at a health care facility  -     Neisseria gonorrhoeae PCR  -     Chlamydia trachomatis PCR    JASON (generalized anxiety disorder)  -     hydrOXYzine (ATARAX) 25 MG tablet; Take 1 tablet (25 mg) by mouth every 6 hours as needed for anxiety  -     propranolol (INDERAL) 40 MG tablet; Take 1 tablet (40 mg) by mouth daily 60-90 minutes prior to stress-inducing event    Gastroesophageal reflux disease, esophagitis presence not specified  -     ranitidine (ZANTAC) 300 MG tablet; Take 0.5 tablets (150 mg) by mouth 2 times daily    Routine follow up in one year.      Options for treatment and follow-up care were reviewed with the patient . Tiara Andrade and/or guardian engaged in the decision making process and verbalized understanding of the options discussed and agreed with the final plan.    Karlie Friedman MD

## 2019-05-07 NOTE — LETTER
May 8, 2019      Tiara Andrade  [address] [id number]        Dear Tiara,    Your test results from your clinic appointment 5/7/2019 came back and you do not have gonorrhea or chlamydia.    Thank you for getting your care at [facility]. Please see below for your test results.    Resulted Orders   Neisseria gonorrhoeae PCR   Result Value Ref Range    Specimen Descrip Urine     N Gonorrhea PCR Negative NEG^Negative      Comment:      Negative for N. gonorrhoeae rRNA by transcription mediated amplification.  A negative result by transcription mediated amplification does not preclude   the presence of N. gonorrhoeae infection because results are dependent on   proper and adequate collection, absence of inhibitors, and sufficient rRNA to   be detected.     Chlamydia trachomatis PCR   Result Value Ref Range    Specimen Description Urine     Chlamydia Trachomatis PCR Negative NEG^Negative      Comment:      Negative for C. trachomatis rRNA by transcription mediated amplification.  A negative result by transcription mediated amplification does not preclude   the presence of C. trachomatis infection because results are dependent on   proper and adequate collection, absence of inhibitors, and sufficient rRNA to   be detected.         If you have any questions, please call the clinic to make an appointment with me for a clinic visit.    Sincerely,    Karlie Friedman MD

## 2019-05-07 NOTE — PATIENT INSTRUCTIONS
Smoking cessation  Put a patch on daily for 6 weeks, and use the gum every time you want to  a cigarette.     In 6 weeks follow up with me to reassess how you are doing and consider stepping down on the patch dose    Preventive Health Recommendations  Female Ages 26 - 39  Yearly exam:   See your health care provider every year in order to    Review health changes.     Discuss preventive care.      Review your medicines if you your doctor has prescribed any.    Until age 30: Get a Pap test every three years (more often if you have had an abnormal result).    After age 30: Talk to your doctor about whether you should have a Pap test every 3 years or have a Pap test with HPV screening every 5 years.   You do not need a Pap test if your uterus was removed (hysterectomy) and you have not had cancer.  You should be tested each year for STDs (sexually transmitted diseases), if you're at risk.   Talk to your provider about how often to have your cholesterol checked.  If you are at risk for diabetes, you should have a diabetes test (fasting glucose).  Shots: Get a flu shot each year. Get a tetanus shot every 10 years.   Nutrition:     Eat at least 5 servings of fruits and vegetables each day.    Eat whole-grain bread, whole-wheat pasta and brown rice instead of white grains and rice.    Get adequate Calcium and Vitamin D.     Lifestyle    Exercise at least 150 minutes a week (30 minutes a day, 5 days of the week). This will help you control your weight and prevent disease.    Limit alcohol to one drink per day.    No smoking.     Wear sunscreen to prevent skin cancer.    See your dentist every six months for an exam and cleaning.

## 2019-06-17 ENCOUNTER — OFFICE VISIT (OUTPATIENT)
Dept: FAMILY MEDICINE | Facility: CLINIC | Age: 29
End: 2019-06-17
Payer: COMMERCIAL

## 2019-06-17 ENCOUNTER — TELEPHONE (OUTPATIENT)
Dept: FAMILY MEDICINE | Facility: CLINIC | Age: 29
End: 2019-06-17

## 2019-06-17 VITALS
BODY MASS INDEX: 21.28 KG/M2 | OXYGEN SATURATION: 100 % | HEIGHT: 66 IN | SYSTOLIC BLOOD PRESSURE: 115 MMHG | RESPIRATION RATE: 16 BRPM | DIASTOLIC BLOOD PRESSURE: 72 MMHG | WEIGHT: 132.4 LBS | TEMPERATURE: 98.1 F | HEART RATE: 53 BPM

## 2019-06-17 DIAGNOSIS — R09.A2 GLOBUS SENSATION: Primary | ICD-10-CM

## 2019-06-17 ASSESSMENT — MIFFLIN-ST. JEOR: SCORE: 1339.56

## 2019-06-17 NOTE — TELEPHONE ENCOUNTER
Received transferred call from  call center and spoke with the patient. Currently patient to be at work, denies any symptoms of hives, wheezing, fever or cold and congestion. Patient has appointment later today at 04:20 Pm with provider, advised to call back or seek urgent care if sever abnormal symptoms develop such as drooling, sudden onset of swelling in face, tongue, throat or experiencing severe symptoms of sore throat/difficulty swallowing. Patient verbalized understanding and agreed with the plan of care.    Message routed to PCP for additional advisement if appropriate.    Susu Boyd RN

## 2019-06-17 NOTE — PROGRESS NOTES
"      HPI:       Tiara Andrade is a 29 year old who presents for the following    Patient complains of feeling of saliva getting stuck in throat for 4-5 days.  Feels light headed but no LOC. Denies fever.  Symptoms are not reproduced with food or liquid.  No nausea or vomitting.  No weight loss.    Has history of GERD and takes Zantac infrequently.  Unsure if history of allergies.  Also reports history of anxiety and wonders if this could be related.    Stopped the nicotine patch after 5 days.  Has been tobacco free for approximately 1 month.       Problem, Medication and Allergy Lists were reviewed and are current..    Patient is an established patient of this clinic.         Physical Exam:     Vitals:    06/17/19 1636   BP: 115/72   Pulse: 53   Resp: 16   Temp: 98.1  F (36.7  C)   TempSrc: Oral   SpO2: 100%   Weight: 60.1 kg (132 lb 6.4 oz)   Height: 1.672 m (5' 5.83\")     Body mass index is 21.48 kg/m .  Vitals were reviewed and were normal  GENERAL: healthy, alert and no distress  EYES: Eyes grossly normal to inspection, extraocular movements - intact, and PERRL  HENT: ear canals- normal; TMs- normal; Nose- normal; Mouth- no ulcers, no lesions  NECK: no tenderness, no adenopathy, no asymmetry, no masses, no stiffness; thyroid- normal to palpation  RESP: lungs clear to auscultation - no rales, no rhonchi, no wheezes  CV: regular rates and rhythm, normal S1 S2, no S3 or S4 and no murmur, no click or rub -  ABDOMEN: soft, no tenderness, no  hepatosplenomegaly, no masses, normal bowel sounds  MS: extremities- no gross deformities noted, no edema  SKIN: no suspicious lesions, no rashes  BACK: no CVA tenderness, no paralumbar tenderness  PSYCH: anxious and worried  LYMPHATICS: normal ant/post cervical, supraclavicular nodes    Assessment and Plan     ASSESSMENT / PLAN:  (F45.8) Globus sensation  (primary encounter diagnosis)  Comment:   Plan: see below.  Currently no red flags.  Congratulated patient on smoking " cessation.    Patient Instructions   Try you Zantac 300 mg one full pill daily or 1/2 pill two times a day.  Try this for about 1-2 weeks.    If unsuccessful, MyChart the clinic and we can start a prescription for generic Zyrtec 10 mg daily.    If that is not successful, I suspect this is related to your anxiety.  Try not to think about the act swallowing.    25 min spent in direct face to face time with this patient, greater than 50% in counseling regarding above.    Options for treatment and follow-up care were reviewed with the patient. Tiara Andrade  engaged in the decision making process and verbalized understanding of the options discussed and agreed with the final plan.    Nagi Grant MD

## 2019-06-17 NOTE — PATIENT INSTRUCTIONS
Try you Zantac 300 mg one full pill daily or 1/2 pill two times a day.  Try this for about 1-2 weeks.    If unsuccessful, MyChart the clinic and we can start a prescription for generic Zyrtec 10 mg daily.    If that is not successful, I suspect this is related to your anxiety.  Try not to think about the act swallowing.

## 2019-10-18 ENCOUNTER — OFFICE VISIT (OUTPATIENT)
Dept: FAMILY MEDICINE | Facility: CLINIC | Age: 29
End: 2019-10-18
Payer: COMMERCIAL

## 2019-10-18 VITALS
BODY MASS INDEX: 20.67 KG/M2 | DIASTOLIC BLOOD PRESSURE: 72 MMHG | OXYGEN SATURATION: 98 % | SYSTOLIC BLOOD PRESSURE: 108 MMHG | WEIGHT: 128.6 LBS | HEIGHT: 66 IN | RESPIRATION RATE: 16 BRPM | HEART RATE: 61 BPM | TEMPERATURE: 97.9 F

## 2019-10-18 DIAGNOSIS — F41.1 GAD (GENERALIZED ANXIETY DISORDER): Primary | ICD-10-CM

## 2019-10-18 LAB
% GRANULOCYTES: 49.3 %G (ref 40–75)
GRANULOCYTES #: 3 K/UL (ref 1.6–8.3)
HCT VFR BLD AUTO: 41.5 % (ref 35–47)
HEMOGLOBIN: 13.6 G/DL (ref 11.7–15.7)
LYMPHOCYTES # BLD AUTO: 2.6 K/UL (ref 0.8–5.3)
LYMPHOCYTES NFR BLD AUTO: 42.9 %L (ref 20–48)
MCH RBC QN AUTO: 30 PG (ref 26.5–35)
MCHC RBC AUTO-ENTMCNC: 32.8 G/DL (ref 32–36)
MCV RBC AUTO: 91.5 FL (ref 78–100)
MID #: 0.5 K/UL (ref 0–2.2)
MID %: 7.8 %M (ref 0–20)
PLATELET # BLD AUTO: 210 K/UL (ref 150–450)
RBC # BLD AUTO: 4.54 M/UL (ref 3.8–5.2)
TSH SERPL DL<=0.005 MIU/L-ACNC: 0.77 MU/L (ref 0.4–4)
WBC # BLD AUTO: 6 K/UL (ref 4–11)

## 2019-10-18 ASSESSMENT — ENCOUNTER SYMPTOMS
DIARRHEA: 0
WEAKNESS: 0
APPETITE CHANGE: 1
HEADACHES: 0
FATIGUE: 1
ABDOMINAL PAIN: 0
CONSTIPATION: 0

## 2019-10-18 ASSESSMENT — MIFFLIN-ST. JEOR: SCORE: 1317.14

## 2019-10-18 NOTE — Clinical Note
Garland, Very nice note-2 things I added were a more detailed mental status exam (needed as our Dx was a mental health one) and I added the PHQ9 and the GAD7 scores that were done.Thanks Fox IBARRA

## 2019-10-18 NOTE — PATIENT INSTRUCTIONS
Here is the plan from today's visit    1. JASON (generalized anxiety disorder)   Try therapy and at home exercises- (running 3x weekly 30-45 mins at home/work) with mindfulness practices for 2 weeks. Includes mindfulness with a meal or other daily activity. Using these practices and if needs additional support in the future, will consider adding serotonin specific reuptake inhibitor.   - TSH with free T4 reflex  - CBC with Diff Plt ([facility])  - BEHAVIORAL HEALTH REFERRAL ([facility] interal and external)  3 good things  Every evening and the 2 hours before you go to bed sit down and write down 3 good things that happened here during the day.  These do not have to be big things that can be as little as enjoying a couple coffee or seeing a beautiful [place].    Write down  - 3 good things that happened that day.  -What was the positive emotion that you felt when you experience that good thing.  For example all or amusement or Char or pride.  -Finally write down what was your part in making each of those good things happen.    When people do the good things exercise daily for as little as 3 weeks studies have shown that this is as effective as an antidepressant.  Even after 6 months has been shown that the effect is still measurable.    Its free, effective, and has no side effects. Give it a try!      Please call or return to clinic if your symptoms don't go away.    Follow up plan  Please make a clinic appointment for follow up with me (CHELA ANDERSEN) in 4-8  weeks for  .    Thank you for coming to [facility] today.  Lab Testing:  **If you had lab testing today and your results are reassuring or normal they will be mailed to you or sent through [employer] within 7 days.   **If the lab tests need quick action we will call you with the results.  The phone number we will call with results is # [phone] (home) . If this is not the best number please call our clinic and change the number.  Medication Refills:  If you  need any refills please call your pharmacy and they will contact us.   If you need to  your refill at a new pharmacy, please contact the new pharmacy directly. The new pharmacy will help you get your medications transferred faster.   Scheduling:  If you have any concerns about today's visit or wish to schedule another appointment please call our office during normal business hours [phone] (8-5:00 M-F)  If a referral was made to a [facility] and you don't get a call from central scheduling please call [phone].  If a Mammogram was ordered for you at [facility] call [phone] to schedule or change your appointment.  If you had an XRay/CT/Ultrasound/MRI ordered the number is [phone] to schedule or change your radiology appointment.   Medical Concerns:  If you have urgent medical concerns please call [phone] at any time of the day.    Bryan Cano MD

## 2019-10-18 NOTE — PROGRESS NOTES
"       HPI       Tiara Andrade is a 29 year old  who presents for   Chief Complaint   Patient presents with     RECHECK     anxiety per patient      Pain     bodyache and muscle pain, uncontrol of the body shake      Anxiety  \"I'm always nervous\" I don't know how to relax anymore. Worried something is physically wrong especially because she feels chest palpitations and tightness. Not exertional, worse at the end of the day and while driving. Sleeps 9-10 hours. Never had anxiety before, but in 2017 after a bad break up started to have anxiety with similar intensity as what she feels now. It let up a little bit and was planning on going to therapy, but the timing didn't work out then. Coming in now because the symptoms have been getting worse over the past month. Feeling shaky a lot and has repetitive thoughts that get out of control. No particularly new stressors, is in a good relationships, but said she thought she say her ex again and maybe that triggered this. She is stressed about taking care of her grandmother and that \"I'm responsible\" for her health as well as work being stressful and demanding. No concern for harrassment or safety. No history of self harm or current self harm. No SI. Has tried marijuana in the past for anxiety, but now it makes her feel paranoid, so no longer uses any. Exercise in the past has helped, she enjoys running. Says she would like to start running like she did and is very likely to start now. She wants to run 3x weekly for 30-45 minutes. Talked about mindfulness which is something she hears about, but hasn't done. Discussed variety of ways to practice. Discussed therapy which she is very motivated to begin. Hesitant about SSRIs because she doesn't want to be on medications and would like to see if other things help first.     Bodyaches  Shoulders, neck, upper arms. Started a few weeks ago with the anxiety worsening. Morning energy, but then very tense at the end of the day. Wondering " "if it's a physical problem because this hadn't come up before when her anxiety was bad. Tensing arms and shoulders while driving. Noticed she is tensing a lot.     \"Euphoria\"  Says it's \"not the good kind\" and describes the feeling as more of a brain fog when she feels the peak of anxiety in a day. Her \"brain hurts, heart hurts\" and \"everything is bright.\" Describing lights to seem brighter and sometimes bothering her eyes. No other visual or auditory changes.     Refill of propranolol   Doesn't use often, but nice to have PRN      +++++++    Problem, Medication and Allergy Lists were reviewed and updated if needed..    Patient is an established patient of this clinic..         Review of Systems:   Review of Systems   Constitutional: Positive for appetite change and fatigue.   HENT: Negative.    Eyes: Negative.    Gastrointestinal: Negative for abdominal pain, constipation and diarrhea.   Endocrine: Negative.    Genitourinary: Negative.    Musculoskeletal: Positive for arthralgias.   Neurological: Negative for weakness and headaches.   Other ROS as described in HPI         Physical Exam:     Vitals:    10/18/19 1347   BP: 108/72   Pulse: 61   Resp: 16   Temp: 97.9  F (36.6  C)   TempSrc: Oral   SpO2: 98%   Weight: 58.3 kg (128 lb 9.6 oz)   Height: 1.664 m (5' 5.5\")     Body mass index is 21.07 kg/m .  Vitals were reviewed and were normal     Physical Exam  Constitutional:       General: She is not in acute distress.     Appearance: Normal appearance.      Comments: Friendly, cooperative, teary during some explanations of anxiety   Neck:      Musculoskeletal: Normal range of motion. No neck rigidity or muscular tenderness.      Vascular: No carotid bruit.   Cardiovascular:      Rate and Rhythm: Regular rhythm. Tachycardia present.      Heart sounds: Normal heart sounds. No murmur. No friction rub. No gallop.    Pulmonary:      Effort: Pulmonary effort is normal.      Breath sounds: Normal breath sounds. "   Neurological:      Mental Status: She is alert.   Psychiatric:         Attention and Perception: Attention and perception normal.         Mood and Affect: Mood is anxious.         Speech: Speech normal.         Behavior: Behavior normal. Behavior is cooperative.         Thought Content: Thought content normal.         Cognition and Memory: Cognition and memory normal.         Judgement: Judgment normal.      Comments: Affect consistent with mood       PHQ-9 SCORE 7/14/2017 4/2/2018 10/19/2019   PHQ-9 Total Score 3 4 7     JASON-7 SCORE 1/9/2018 4/2/2018 10/19/2019   Total Score 11 14 15             Results:      Results from this visit  Results for orders placed or performed in visit on 10/18/19   TSH with free T4 reflex   Result Value Ref Range    TSH 0.77 0.40 - 4.00 mU/L   CBC with Diff Plt ([facility])   Result Value Ref Range    WBC 6.0 4.0 - 11.0 K/uL    Lymphocytes # 2.6 0.8 - 5.3 K/uL    % Lymphocytes 42.9 20.0 - 48.0 %L    Mid # 0.5 0.0 - 2.2 K/uL    Mid % 7.8 0.0 - 20.0 %M    GRANULOCYTES # 3.0 1.6 - 8.3 K/uL    % Granulocytes 49.3 40.0 - 75.0 %G    RBC 4.54 3.80 - 5.20 M/uL    Hemoglobin 13.6 11.7 - 15.7 g/dL    Hematocrit 41.5 35.0 - 47.0 %    MCV 91.5 78.0 - 100.0 fL    MCH 30.0 26.5 - 35.0 pg    MCHC 32.8 32.0 - 36.0 g/dL    Platelets 210.0 150.0 - 450.0 K/uL       Assessment and Plan      Tiara Andrade is a 29 year old female with a pmh significant for anxiety presenting today with worsening anxiety over the past month complicated by muscle tension and soreness.   Tiara was seen today for recheck and pain.    Diagnoses and all orders for this visit:    JASON (generalized anxiety disorder)  Body aches likely due to anxiety as they relate to increased tension during peaks of anxiety. Chest pain and palpitations non-exertional and no family history of heart problems besides later age hypertension. No personal history of a heart problem and normal physical exam findings. Wants to try therapy with mindfulness  practice and exercise at home (running 3x weekly, 30-45 minutes). Will consider serotonin specific reuptake inhibitor medication if anxiety worsens or therapy doesn't get desired results. Patient is motivated and has specific plans and goals.   -     TSH with free T4 reflex  -     CBC with Diff Plt ([facility])  -     BEHAVIORAL HEALTH REFERRAL ([facility] interal and external)  - F/u 3-4 weeks or PRN if symptoms worsen           Medications Discontinued During This Encounter   Medication Reason     hydrOXYzine (ATARAX) 25 MG tablet Reorder     propranolol (INDERAL) 40 MG tablet Reorder       Options for treatment and follow-up care were reviewed with the patient. Tiara Andrade  engaged in the decision making process and verbalized understanding of the options discussed and agreed with the final plan.    Garland Sam, MS3    I was present with the medical student who participated in the service and in the documentation of this note. I have verified the history and personally performed the physical exam and medical decision making, and have verified the content of the note, which accurately reflects my assessment of the patient and the plan of care. I spent 25 minutes with the patient greater than half of the time was spent in counseling and care coordination regarding her anxiety.   Bryan Cano MD

## 2019-10-19 RX ORDER — PROPRANOLOL HYDROCHLORIDE 40 MG/1
40 TABLET ORAL DAILY
Qty: 30 TABLET | Refills: 11 | Status: SHIPPED | OUTPATIENT
Start: 2019-10-19 | End: 2020-12-08

## 2019-10-19 RX ORDER — HYDROXYZINE HYDROCHLORIDE 25 MG/1
25 TABLET, FILM COATED ORAL EVERY 6 HOURS PRN
Qty: 30 TABLET | Refills: 11 | Status: SHIPPED | OUTPATIENT
Start: 2019-10-19 | End: 2020-12-08

## 2019-10-19 ASSESSMENT — ANXIETY QUESTIONNAIRES
7. FEELING AFRAID AS IF SOMETHING AWFUL MIGHT HAPPEN: MORE THAN HALF THE DAYS
GAD7 TOTAL SCORE: 15
3. WORRYING TOO MUCH ABOUT DIFFERENT THINGS: NEARLY EVERY DAY
1. FEELING NERVOUS, ANXIOUS, OR ON EDGE: NEARLY EVERY DAY
6. BECOMING EASILY ANNOYED OR IRRITABLE: SEVERAL DAYS
5. BEING SO RESTLESS THAT IT IS HARD TO SIT STILL: MORE THAN HALF THE DAYS
2. NOT BEING ABLE TO STOP OR CONTROL WORRYING: MORE THAN HALF THE DAYS

## 2019-10-19 ASSESSMENT — PATIENT HEALTH QUESTIONNAIRE - PHQ9
5. POOR APPETITE OR OVEREATING: MORE THAN HALF THE DAYS
SUM OF ALL RESPONSES TO PHQ QUESTIONS 1-9: 7

## 2019-10-19 ASSESSMENT — ENCOUNTER SYMPTOMS
EYES NEGATIVE: 1
ENDOCRINE NEGATIVE: 1
ARTHRALGIAS: 1

## 2019-10-20 ASSESSMENT — ANXIETY QUESTIONNAIRES: GAD7 TOTAL SCORE: 15

## 2019-10-23 ENCOUNTER — TELEPHONE (OUTPATIENT)
Dept: PSYCHOLOGY | Facility: CLINIC | Age: 29
End: 2019-10-23

## 2019-10-23 NOTE — TELEPHONE ENCOUNTER
Mental Health Referral:  Please schedule with Rowan Castillo or Kaylan      Please let patient know this is for primary care behavioral health services.  Therapists at our clinic are able to see patients for 8-12 sessions. If further assistance is needed, they will help the patient connect with ongoing services in the community.    If you are unable to reach the patient after two phone attempts, please send a letter and close the encounter.      Thank you!

## 2019-10-25 ENCOUNTER — OFFICE VISIT (OUTPATIENT)
Dept: FAMILY MEDICINE | Facility: CLINIC | Age: 29
End: 2019-10-25
Payer: COMMERCIAL

## 2019-10-25 VITALS
RESPIRATION RATE: 16 BRPM | BODY MASS INDEX: 20.96 KG/M2 | HEART RATE: 53 BPM | OXYGEN SATURATION: 99 % | WEIGHT: 130.4 LBS | HEIGHT: 66 IN | TEMPERATURE: 98.3 F | SYSTOLIC BLOOD PRESSURE: 102 MMHG | DIASTOLIC BLOOD PRESSURE: 70 MMHG

## 2019-10-25 DIAGNOSIS — F41.1 GAD (GENERALIZED ANXIETY DISORDER): ICD-10-CM

## 2019-10-25 DIAGNOSIS — Z23 NEED FOR PROPHYLACTIC VACCINATION AND INOCULATION AGAINST INFLUENZA: Primary | ICD-10-CM

## 2019-10-25 DIAGNOSIS — Z71.6 ENCOUNTER FOR TOBACCO USE CESSATION COUNSELING: ICD-10-CM

## 2019-10-25 ASSESSMENT — PAIN SCALES - GENERAL: PAINLEVEL: NO PAIN (0)

## 2019-10-25 ASSESSMENT — MIFFLIN-ST. JEOR: SCORE: 1333.24

## 2019-10-25 NOTE — TELEPHONE ENCOUNTER
10/25/19 Patient is scheduled with  for 10/30/19 @ 2:00p.m. Patient has been informed.    Ramya Leonardo  Care Coordinator

## 2019-10-25 NOTE — PROGRESS NOTES
"       HPI       Tiara Andrade is a 29 year old  who presents for   Chief Complaint   Patient presents with     Follow Up     Patient is here for med follow up      - Anxiety f/u  Taking hydroxyzine at night. Taking propranolol PRN with relief during presentations. Did go running a few times at the [employer]. Using CBD oil on tongue with help to decrease upper body tension. Stopped using the THC with improvement in sleep, no longer oversleeping. Wanting to schedule behavioral health, haven't been able to yet. Continued shaking at the end of the day, less overall tension. Some chest tightness, but not worse. No low mood/depression. No chest pain. No unexpected fatigue. Discussed work form- disability accommodations. When it's at its worst, she can do a half day and go to work the next day.     -Cigarettes   Planning on quitting next week Thursday. Knows that she might get more anxious, but feels ready. Previously quit for 4 months cold-turkey, has tried patches and things before without help. Needs a soothing after-work routine to replace the habit.Exercise, reading, watching TV, and drinking milk. Wants to quit because smoking is a stimulant and knows it makes her shaking worse, \"I know I just have to quit and say 'enough'\"     +++++++    Problem, Medication and Allergy Lists were reviewed and updated if needed..    Patient is an established patient of this clinic..         Review of Systems:   Review of Systems  10 point ROS negative besides as described above         Physical Exam:     Vitals:    10/25/19 1319   BP: 102/70   Pulse: 53   Resp: 16   Temp: 98.3  F (36.8  C)   TempSrc: Oral   SpO2: 99%   Weight: 59.1 kg (130 lb 6.4 oz)   Height: 1.676 m (5' 6\")     Body mass index is 21.05 kg/m .  Vitals were reviewed and were normal     Physical Exam  Constitutional:       General: She is not in acute distress.     Appearance: Normal appearance.   Eyes:      Extraocular Movements: Extraocular movements intact. "   Cardiovascular:      Rate and Rhythm: Normal rate and regular rhythm.      Heart sounds: Normal heart sounds. No murmur. No gallop.    Pulmonary:      Effort: Pulmonary effort is normal. No respiratory distress.      Breath sounds: Normal breath sounds. No wheezing.   Neurological:      General: No focal deficit present.      Mental Status: She is alert and oriented to person, place, and time. Mental status is at baseline.   Psychiatric:         Mood and Affect: Mood normal.         Behavior: Behavior normal.         Thought Content: Thought content normal.         Judgement: Judgment normal.           Results:   No testing ordered today    Assessment and Plan      Tiara Andrade is a 29 year old female with a pmh significant for anxiety presenting today for anxiety follow-up and discussing tobacco cessation.       Diagnoses and all orders for this visit:    Need for prophylactic vaccination and inoculation against influenza  -     Fluzone quad, preserve-free/prefilled  0.5ml, 6+ months [31165]    JASON (generalized anxiety disorder)  Improvement of symptoms since visit last week with hydroxyzine and exercise, 3 good things, and mindfulness recommendations. Patient is very motivated and encouraged by the improvement although symptoms not yet resolved including muscle tension and shaking at the end of the day. Appropriate to stay at current hydroxyzine dose and keep propranolol PRN, although ultimate goal is to get off the medications and continue lifestyle practices for anxiety. CBD oil under tongue helps with tension, continue. Will schedule behavior health and f/u to PCP in 3 weeks. Possible that smoking cessation will increase anxiety, but may also decrease overall. Would like us to fill out a disability accommodation form for work and will have work send it.     Encounter for tobacco use cessation counseling  Quit date 10/31/19. Helpful things in stress reduction and replacing the tobacco routine: Exercise, reading,  watching TV. Making sure there's milk at work. Telling other people your quit date and plans are helpful in supporting long-term changes. Reasons to quit: Smoking is a stimulant and increases anxiety, felt better off it with previously quitting, taking care of self overall.   -     SMOKING CESSATION COUNSELING 3-10 MIN (Tobacco Nicotine) [54506]      Follow-up: 3 weeks for anxiety     There are no discontinued medications.    Options for treatment and follow-up care were reviewed with the patient. Tiara Andrade  engaged in the decision making process and verbalized understanding of the options discussed and agreed with the final plan.    Garland Sam, MS3    Preceptor Attestation:  I was present with the medical student who participated in the service and in the documentation of this note. I have verified the history and personally performed the physical exam and medical decision making. I have verified the content of the note, which accurately reflects my assessment of the patient and the plan of care.   Supervising Physician:  Nona Cullen MD.    I spent 25 min face to face with the patient, separate from tobacco cessation counseling, and >50% was spent counselling the patient about the above medical conditions, educating patient and discussing the recommendations and followup .    Nona Cullen MD   [facility]

## 2019-10-25 NOTE — PATIENT INSTRUCTIONS
Here is the plan from today's visit    1. JASON (generalized anxiety disorder)  Continue hydroxyzine and propranolol. Schedule with behavioral health. Exercising helpful 3x weekly. 3 good things practice, mindfulness practice. Have work fax over accommodations form.     2. Encounter for tobacco use cessation counseling  Quit date 10/31/19. Helpful things in stress reduction and replacing the tobacco routine: Exercise, reading, watching TV. Making sure there's milk at work. Telling other people your quit date and plans are helpful in supporting long-term changes. Reasons to quit: Smoking is a stimulant and increases anxiety, felt better off it with previously quitting, taking care of self overall.   - SMOKING CESSATION COUNSELING 3-10 MIN (Tobacco Nicotine) [11170]    3. Need for prophylactic vaccination and inoculation against influenza  - Fluzone quad, preserve-free/prefilled  0.5ml, 6+ months [59427]      Please call or return to clinic if your symptoms don't go away.    Follow up plan  Please make a clinic appointment for follow up with me (KAYA STAUFFER) in 3  weeks for anxiety follow-up.    Thank you for coming to [facility] today.  Lab Testing:  **If you had lab testing today and your results are reassuring or normal they will be mailed to you or sent through [employer] within 7 days.   **If the lab tests need quick action we will call you with the results.  The phone number we will call with results is # [phone] (home) . If this is not the best number please call our clinic and change the number.  Medication Refills:  If you need any refills please call your pharmacy and they will contact us.   If you need to  your refill at a new pharmacy, please contact the new pharmacy directly. The new pharmacy will help you get your medications transferred faster.   Scheduling:  If you have any concerns about today's visit or wish to schedule another appointment please call our office during normal  business hours [phone] (8-5:00 M-F)  If a referral was made to a [facility] and you don't get a call from central scheduling please call [phone].  If a Mammogram was ordered for you at [facility] call [phone] to schedule or change your appointment.  If you had an XRay/CT/Ultrasound/MRI ordered the number is [phone] to schedule or change your radiology appointment.   Medical Concerns:  If you have urgent medical concerns please call [phone] at any time of the day.    Nona Cullen MD

## 2019-11-08 ENCOUNTER — OFFICE VISIT (OUTPATIENT)
Dept: PSYCHOLOGY | Facility: CLINIC | Age: 29
End: 2019-11-08
Payer: COMMERCIAL

## 2019-11-08 DIAGNOSIS — F41.1 GENERALIZED ANXIETY DISORDER WITH PANIC ATTACKS: Primary | ICD-10-CM

## 2019-11-08 DIAGNOSIS — F41.0 GENERALIZED ANXIETY DISORDER WITH PANIC ATTACKS: Primary | ICD-10-CM

## 2019-11-08 ASSESSMENT — ANXIETY QUESTIONNAIRES
3. WORRYING TOO MUCH ABOUT DIFFERENT THINGS: NEARLY EVERY DAY
6. BECOMING EASILY ANNOYED OR IRRITABLE: SEVERAL DAYS
7. FEELING AFRAID AS IF SOMETHING AWFUL MIGHT HAPPEN: NEARLY EVERY DAY
GAD7 TOTAL SCORE: 18
2. NOT BEING ABLE TO STOP OR CONTROL WORRYING: NEARLY EVERY DAY
1. FEELING NERVOUS, ANXIOUS, OR ON EDGE: NEARLY EVERY DAY
IF YOU CHECKED OFF ANY PROBLEMS ON THIS QUESTIONNAIRE, HOW DIFFICULT HAVE THESE PROBLEMS MADE IT FOR YOU TO DO YOUR WORK, TAKE CARE OF THINGS AT HOME, OR GET ALONG WITH OTHER PEOPLE: EXTREMELY DIFFICULT
4. TROUBLE RELAXING: NEARLY EVERY DAY
5. BEING SO RESTLESS THAT IT IS HARD TO SIT STILL: MORE THAN HALF THE DAYS

## 2019-11-08 ASSESSMENT — PATIENT HEALTH QUESTIONNAIRE - PHQ9: SUM OF ALL RESPONSES TO PHQ QUESTIONS 1-9: 6

## 2019-11-08 NOTE — PROGRESS NOTES
Behavioral Health Progress Note    Client Legal Name: Tiara Andrade   Client Preferred Name: Tiara   Service Type: Individual  Length of Visit: 30 minutes  Attendees: patient     Identifying Information and Presenting Problem:    The patient is a 29 year old Bahamian female who is being seen for problematic symptoms of generalized anxiety and panic attacks.    Treatment Objective(s) Addressed in This Session:  Anxiety: will experience a reduction in anxiety and will develop more effective coping skills to manage anxiety symptoms    Progress on / Status of Treatment Objective(s) / Homework:  Satisfactory progress     PHQ-9 SCORE 4/2/2018 10/19/2019 11/8/2019   PHQ-9 Total Score 4 7 6       JASON-7 SCORE 4/2/2018 10/19/2019 11/8/2019   Total Score 14 15 18       Topics Discussed/Interventions Provided:    This was my first session with this patient and session today focused on building rapport and gathering information. Patient reported struggling with anxiety for the past 2 years. The only trigger she could identify was a bad break up in 2017 where she started experiencing high anxiety. This improved somewhat, however, in the past few months she noted that anxiety has worsened to the point where she's having anxiety attacks almost everyday.     She described symptoms congruent with panic attacks - shaking, increased heart rate, difficulty breathing, feeling lightheaded, can't think, starts sweating, and believes something is terribly wrong or that she will faint. Said these occur almost daily and she is now developing fears of leaving her home for fear a panic attack will happen in her car, at the grocery store, or at work. When they happen at work she sometimes has to leave early or miss work the next day. She said the panic attacks occur unexpectedly, although, there are certain situations that trigger panic symptoms (e.g., driving, racing thoughts and worries of having a panic attack).    Also described generalized  "anxiety symptoms related to worry that she can't control about many different things. Said her mind is always on the go. Feels like she cannot relax. Endorsed muscle tension. Feels irritable and cannot engage in social relationships the way she did before (e.g., \"I used to take care everyone, now I'm telling them to go away, I can't help because I just need to rest\"). It appeared that these racing thoughts sometimes triggered panic symptoms. She seems to get tension headaches (\"everything is bright, my brain hurts\").     She has been prescribed hydroxizine and propanolol for managing anxiety. Although the hydroxizine is supposed to be taken every 6 hours, she noted that she only takes this as needed (maybe once a day) because when she took every 6 hours she felt tired and sleepy. She is taking the propanolol daily when she identifies oncoming symptoms of panic. This is sometimes helpful and allows her to return to work. She has declined taking SSRIs in the past (said someone suggested Zoloft), because she does not like taking pills, but she is reconsidering this decision. I provided brief information on how the medication helps with anxiety. Based on this discussion, Tiara scheduled a medical appointment to further discuss a serotonin specific reuptake inhibitor.     Provided empathic support and validation. Brief psychoeducation given on anxiety and the effect on the autonomic nervous system. Provided a handout with information on panic attacks and course of treatment options to reduce anxiety and panic.     Assessment: The patient appeared to be active and engaged in today's session and was receptive to feedback.     Mental Status: Tiara appeared generally alert and oriented. Dress was casual and appropriate to the weather and occasion. Grooming and hygiene were good. Eye contact was fair. Speech was of normal volume and rate and was clear, coherent, and relevant. Mood was anxious/distressed with congruent affect. " Thought processes were relevant, logical and goal-directed. Thought content was WNL with no evidence of psychotic or paranoid features. No evidence of SI/HI or self-harm, intent, or plans. Memory appeared grossly intact. Insight and judgment appeared fair and patient exhibited appropriate impulse control during the appointment.     Does the patient appear to be at imminent risk of harm to self/others at this time? No    The session was necessary to address problematic symptoms of anxiety and panic attacks that have been interfering with patient's ability to function in daily life.  Ongoing psychotherapy is necessary to provide counseling, improve functioning with daily activities, provide psychoeducation and provide support.    Diagnosis (DSM-5):  Generalized anxiety disorder with panic attacks    Plan:  1. Follow up in 1 weeks.  2. Work on goals as noted in patient instructions.  3. Utilize crisis resources as needed.      NOTE: Treatment plan update due at next visit.  Diagnostic assessment update due at next visit.    Kayla Francois, PhD.  Behavioral Health Fellow

## 2019-11-08 NOTE — PATIENT INSTRUCTIONS
Thank you for coming in today.    Just a reminder that if you experience an increase in symptoms or feel you are in crisis, you can call the clinic number ([phone]).      If you are having an acute psychiatric emergency, please call 911 or have someone drive you directly to [facility] for further evaluation.  The homework/goals we discussed today are: Read over handout  Please follow-up in 1 weeks.  Again thank you for choosing [facility] and please let us know how we can best partner with you to improve your and your family's health.      Panic Disorder Handout  What Is a Panic Attack?   Sometimes we experience a sudden and severe onset of symptoms that can be scary. These symptoms can include some or all of the following:    Pounding heart or increased heart rate   Feeling dizzy, unsteady, lightheaded, or faint     Sweating    Nausea   Feelings of unreality or being detached from yourself     Trembling or shaking   Fear of losing control or going crazy     Shortness of breath   Fear of dying     Feeling of choking   Numbness or tingling     Chest pain   Chills or hot flashes     Although we don t fully understand why some people experience panic attacks and other people don t, we do know that these symptoms are related to a very normal response called the fight-flight response. This response allows our body to react quickly when we think that something is dangerous, such as being attacked or being cut off when we are driving.     How Panic Attacks Affect Our Lives   Because symptoms of a panic attack occur out of the blue, we can become worried about them, and we may begin to avoid situations that we think will result in these panic symptoms, such as crowded stores, public transportation, or driving. What situations have you avoided because of panic attacks?            Changing Thinking Patterns  One of the most important changes associated with decreasing panic attacks is  changing how we think. The fear associated with having a panic attack may increase the likelihood of having an attack. Therefore, a willingness to experience a panic attack, knowing that the symptoms, although uncomfortable, will not harm you, is an important aspect of managing the symptoms of panic.    Thinking that increases panic Thinking that decreases panic   I m having a heart attack! This is not an emergency.   I m going to die. This doesn t feel good, but it won t hurt me.   I can t stand this. I can feel uncomfortable and still be OK.   I have to get out of here. This will go away with time.   Oh no, here it comes! I can handle this.     What are the things that you say to yourself that may increase panic symptoms?               What could you say to decrease panic symptoms?         Breathing Retraining  People who have panic attacks show some signs of hyperventilation or overbreathing. When people hyperventilate, certain blood vessels in the body become narrower, which can contribute to numbness or tingling in the hands or feet or the sensation of cold, clammy hands, and increased heart rate. You can help overcome overbreathing by learning breathing control.  Instructions for Breathing Retraining   1. Choose a comfortable quiet location.  2. Count,  One,  on the first breath in, and think,  Relax,  on the breath out.  3. Focus your attention on breathing and counting.  4. Maintain a normal rate and depth of breathing.  5. Expand your abdomen on the breath in and keep your chest still.  6. Continue counting up to 10 and back to 1.  7. Practice two times per day, for 10 minutes each time.  Decreasing Avoidance  Regardless of whether you can identify why you began having panic attacks or whether they seemed to come out of the blue, the places where you began having panic attacks often can become triggers themselves.   To break the cycle of avoidance, it is important to first identify the places or situations  that are being avoided and then to do some  relearning.  Just as the negative experience of a panic attack can result in learning to avoid certain locations, having positive, successful experiences can result in learning that the location is nothing to be afraid of.    Which item on your list of avoided locations or situations would you like to target first?     Please list this situation here: _______________________   Now, you can develop a hierarchy for this situation or location. A hierarchy is a list of situations that result in increasingly higher intensities of anxiety. Develop a list of situations that result in a range of anxiety intensities, that is, some result in low intensity and others result in higher intensities. Then rank order the situation from the lowest to the highest anxiety producing situations. This hierarchy will help guide you as you gradually begin to  expose  yourself to the situation or location that you have been avoiding    YOHANNES Garrett., RENNY Disla., Austen, M. S., & MATEO Castellanos. (2009). Integrated Behavioral Evin in Primary Care: Step-by-Step Guidance for Assessment and Intervention. American [employer] Association.

## 2019-11-09 ASSESSMENT — ANXIETY QUESTIONNAIRES: GAD7 TOTAL SCORE: 18

## 2019-11-11 NOTE — PROGRESS NOTES
Preceptor Attestation:  I have reviewed and agree with the behavioral health fellow's documentation for this visit.  I did not see the patient.  Supervising Clinical Psychologist:  Yanelis Kimbrough PSYD LP

## 2019-11-18 ENCOUNTER — OFFICE VISIT (OUTPATIENT)
Dept: FAMILY MEDICINE | Facility: CLINIC | Age: 29
End: 2019-11-18
Payer: COMMERCIAL

## 2019-11-18 VITALS
OXYGEN SATURATION: 99 % | RESPIRATION RATE: 16 BRPM | HEART RATE: 64 BPM | TEMPERATURE: 98.2 F | WEIGHT: 128.8 LBS | HEIGHT: 66 IN | BODY MASS INDEX: 20.7 KG/M2 | DIASTOLIC BLOOD PRESSURE: 72 MMHG | SYSTOLIC BLOOD PRESSURE: 112 MMHG

## 2019-11-18 DIAGNOSIS — F41.1 GAD (GENERALIZED ANXIETY DISORDER): Primary | ICD-10-CM

## 2019-11-18 DIAGNOSIS — Z02.89 ENCOUNTER FOR COMPLETION OF FORM WITH PATIENT: ICD-10-CM

## 2019-11-18 ASSESSMENT — ENCOUNTER SYMPTOMS
COLOR CHANGE: 0
ARTHRALGIAS: 0
VOMITING: 0
NERVOUS/ANXIOUS: 0
CONFUSION: 0
ABDOMINAL PAIN: 0
BACK PAIN: 0
DECREASED CONCENTRATION: 0
HEMATURIA: 0
CHILLS: 0
SORE THROAT: 0
FEVER: 0
PALPITATIONS: 0
EYE PAIN: 0
COUGH: 0
SHORTNESS OF BREATH: 0
SEIZURES: 0
DYSURIA: 0

## 2019-11-18 ASSESSMENT — PAIN SCALES - GENERAL: PAINLEVEL: NO PAIN (0)

## 2019-11-18 ASSESSMENT — MIFFLIN-ST. JEOR: SCORE: 1328.23

## 2019-11-18 NOTE — PATIENT INSTRUCTIONS
Here is the plan from today's visit    1. Encounter for completion of form with patient  - Filled out FMAL/Disability form    2. JASON (generalized anxiety disorder)      Follow up plan  Follow up as needed    Thank you for coming to [facility] today.  Lab Testing:  **If you had lab testing today and your results are reassuring or normal they will be mailed to you or sent through [facility] within 7 days.   **If the lab tests need quick action we will call you with the results.  The phone number we will call with results is # [phone] (home) . If this is not the best number please call our clinic and change the number.  Medication Refills:  If you need any refills please call your pharmacy and they will contact us.   If you need to  your refill at a new pharmacy, please contact the new pharmacy directly. The new pharmacy will help you get your medications transferred faster.   Scheduling:  If you have any concerns about today's visit or wish to schedule another appointment please call our office during normal business hours [phone] (8-5:00 M-F)  If a referral was made to a [facility] and you don't get a call from central scheduling please call [phone].  If a Mammogram was ordered for you at [facility] call [phone] to schedule or change your appointment.  If you had an XRay/CT/Ultrasound/MRI ordered the number is [phone] to schedule or change your radiology appointment.   Medical Concerns:  If you have urgent medical concerns please call [phone] at any time of the day.    Eva Machado MD

## 2019-11-18 NOTE — PROGRESS NOTES
HPI       Tiara Andrade is a 29 year old  who presents for   Chief Complaint   Patient presents with     Forms     Patient is here for disability form        Pt here for disability form to be filled out for JASON. So work does not coutn excuses against her ot take PTO time away. Pt has had anxiety since 2017. Pt gets fast heart rate, sweaty, light-headed, breaths faster, can't focus, and almost feels like passing out.  Impairs with ADLs and work when it comes on. Is mainly triggered by being overwhelmed. Gets panic attacks 1-2 twice a years. Was using marijuana at first, but made things worse so stopped 3 weeks ago and has noticed a difference. Currently, taking Atarax and propanolol for anxeity and has needed Atarax every day for a month and propanolol about every 2 weeks. 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 Sees a therapist still. Currently her anxiety is doing well and is not having any of the above mentioned symptoms.       +++++++    Problem, Medication and Allergy Lists were reviewed and updated if needed..    Patient is an established patient of this clinic..         Review of Systems:   Review of Systems   Constitutional: Negative for chills and fever.   HENT: Negative for ear pain and sore throat.    Eyes: Negative for pain and visual disturbance.   Respiratory: Negative for cough and shortness of breath.    Cardiovascular: Negative for chest pain and palpitations.   Gastrointestinal: Negative for abdominal pain and vomiting.   Genitourinary: Negative for dysuria and hematuria.   Musculoskeletal: Negative for arthralgias and back pain.   Skin: Negative for color change and rash.   Neurological: Negative for seizures and syncope.   Psychiatric/Behavioral: Negative for confusion, decreased concentration and self-injury. The patient is not nervous/anxious.    All other systems reviewed and are negative.           Physical Exam:  "    Vitals:    11/18/19 1302   BP: 112/72   Pulse: 64   Resp: 16   Temp: 98.2  F (36.8  C)   TempSrc: Oral   SpO2: 99%   Weight: 58.4 kg (128 lb 12.8 oz)   Height: 1.68 m (5' 6.14\")     Body mass index is 20.7 kg/m .  Vitals were reviewed and were normal     Physical Exam  Vitals signs and nursing note reviewed.   Constitutional:       General: She is not in acute distress.     Appearance: Normal appearance. She is normal weight.   Cardiovascular:      Rate and Rhythm: Normal rate and regular rhythm.      Pulses: Normal pulses.      Heart sounds: Normal heart sounds. No murmur.   Pulmonary:      Effort: Pulmonary effort is normal.      Breath sounds: Normal breath sounds.   Skin:     General: Skin is warm and dry.      Capillary Refill: Capillary refill takes less than 2 seconds.   Neurological:      Mental Status: She is alert and oriented to person, place, and time.   Psychiatric:         Mood and Affect: Mood normal.         Thought Content: Thought content normal.         Judgment: Judgment normal.           Results:   No testing ordered today    Assessment and Plan      Tiara Andrade is a 29 year old  who presents for disability/FMLA form to be filled out.    1. Encounter for completion of form with patient  - Filled out FMAL/Disability form    2. JASON (generalized anxiety disorder)    - Patients anxiety seems to be doing well at the moment  - Will come as needs for her anxiety      There are no discontinued medications.    Options for treatment and follow-up care were reviewed with the patient. Tiara Andrade  engaged in the decision making process and verbalized understanding of the options discussed and agreed with the final plan.    Eva Machado MD  "

## 2019-11-20 ENCOUNTER — DOCUMENTATION ONLY (OUTPATIENT)
Dept: FAMILY MEDICINE | Facility: CLINIC | Age: 29
End: 2019-11-20

## 2019-11-20 NOTE — PROGRESS NOTES
"When opening a documentation only encounter, be sure to enter in \"Chief Complaint\" Forms and in \" Comments\" Title of form, description if needed.    Tiara is a 29 year old  female  Form received via: Fax  Form now resides in: Provider Ready    Yamile Fletcher CMA          Form has been completed by provider.     Form sent out via: Fax to [facility] and group disability insurance  at Fax Number: [phone]  Patient informed: Yes  Output date: January 23, 2020    Yamile Fletcher CMA      **Please close the encounter          "

## 2019-12-13 ENCOUNTER — TELEPHONE (OUTPATIENT)
Dept: PSYCHOLOGY | Facility: CLINIC | Age: 29
End: 2019-12-13

## 2019-12-13 ENCOUNTER — OFFICE VISIT (OUTPATIENT)
Dept: FAMILY MEDICINE | Facility: CLINIC | Age: 29
End: 2019-12-13
Payer: COMMERCIAL

## 2019-12-13 VITALS
BODY MASS INDEX: 20 KG/M2 | DIASTOLIC BLOOD PRESSURE: 58 MMHG | WEIGHT: 127.4 LBS | OXYGEN SATURATION: 100 % | SYSTOLIC BLOOD PRESSURE: 93 MMHG | HEIGHT: 67 IN | TEMPERATURE: 98.3 F | HEART RATE: 54 BPM | RESPIRATION RATE: 16 BRPM

## 2019-12-13 DIAGNOSIS — K21.9 GASTROESOPHAGEAL REFLUX DISEASE, ESOPHAGITIS PRESENCE NOT SPECIFIED: Primary | ICD-10-CM

## 2019-12-13 DIAGNOSIS — Z02.89 ENCOUNTER FOR COMPLETION OF FORM WITH PATIENT: ICD-10-CM

## 2019-12-13 RX ORDER — FAMOTIDINE 40 MG/1
40 TABLET, FILM COATED ORAL DAILY
Qty: 60 TABLET | Refills: 3 | Status: SHIPPED | OUTPATIENT
Start: 2019-12-13 | End: 2020-07-15

## 2019-12-13 ASSESSMENT — ANXIETY QUESTIONNAIRES
3. WORRYING TOO MUCH ABOUT DIFFERENT THINGS: SEVERAL DAYS
5. BEING SO RESTLESS THAT IT IS HARD TO SIT STILL: NOT AT ALL
7. FEELING AFRAID AS IF SOMETHING AWFUL MIGHT HAPPEN: NOT AT ALL
GAD7 TOTAL SCORE: 5
IF YOU CHECKED OFF ANY PROBLEMS ON THIS QUESTIONNAIRE, HOW DIFFICULT HAVE THESE PROBLEMS MADE IT FOR YOU TO DO YOUR WORK, TAKE CARE OF THINGS AT HOME, OR GET ALONG WITH OTHER PEOPLE: SOMEWHAT DIFFICULT
2. NOT BEING ABLE TO STOP OR CONTROL WORRYING: SEVERAL DAYS
6. BECOMING EASILY ANNOYED OR IRRITABLE: SEVERAL DAYS
1. FEELING NERVOUS, ANXIOUS, OR ON EDGE: SEVERAL DAYS

## 2019-12-13 ASSESSMENT — PATIENT HEALTH QUESTIONNAIRE - PHQ9
SUM OF ALL RESPONSES TO PHQ QUESTIONS 1-9: 1
5. POOR APPETITE OR OVEREATING: SEVERAL DAYS

## 2019-12-13 ASSESSMENT — MIFFLIN-ST. JEOR: SCORE: 1328.12

## 2019-12-13 NOTE — Clinical Note
I saw this patient for FMLA forms to not lose her job if she misses work for a panic attack (quite infrequent). There's a BH component that requires an attending signature, so Patricia left it in your mailbox.

## 2019-12-13 NOTE — PROGRESS NOTES
"       HPI       Tiara Andrade is a 29 year old  who presents for   Chief Complaint   Patient presents with     Forms     Here to discuss the forms     Pain     x 1 week, Muscle tightness around chest area, has issues sleeping due to it. Had to sleep elevated due to the tightness. usually happnes at night.     1. Chest discomfort  Epigastric and chest discomfort for the last week, feels like a tightness. Gets better with sleeping on multiple pillows. Just stopped smoking 1 week ago. No coughing, fevers, weight changes. Has had GERD in the past and neg H pylori testing. No n/v/bowel changes. No recent panic attacks.    2. Forms  Completed disability forms with Dr Machado last week for FMLA coverage if she needs to miss work due to a panic attack from her JASON. Overall her anxiety is much improved - only misses about 2 days per year. Well controlled on her current meds - propranolol and atarax. Mood is quite good as well.  Had a new application sent to Dr Cullen- needs attending physician behavioral health extra form in order to be processed.    JASON-7 SCORE 10/19/2019 11/8/2019 12/13/2019   Total Score 15 18 5     PHQ-9 SCORE 10/19/2019 11/8/2019 12/13/2019   PHQ-9 Total Score 7 6 1       Problem, Medication and Allergy Lists were reviewed and updated if needed..    Patient is an established patient of this clinic..         Review of Systems:   Review of Systems         Physical Exam:     Vitals:    12/13/19 1138   BP: 93/58   Pulse: 54   Resp: 16   Temp: 98.3  F (36.8  C)   TempSrc: Oral   SpO2: 100%   Weight: 57.8 kg (127 lb 6.4 oz)   Height: 1.69 m (5' 6.54\")     Body mass index is 20.23 kg/m .  Vitals were reviewed and were normal     Physical Exam  Constitutional:       General: She is not in acute distress.     Appearance: Normal appearance. She is normal weight. She is not ill-appearing.   HENT:      Head: Normocephalic and atraumatic.      Mouth/Throat:      Mouth: Mucous membranes are moist.      Pharynx: " Oropharynx is clear. No posterior oropharyngeal erythema.   Cardiovascular:      Rate and Rhythm: Normal rate and regular rhythm.      Heart sounds: No murmur.   Pulmonary:      Effort: Pulmonary effort is normal. No respiratory distress.   Abdominal:      General: There is no distension.      Palpations: Abdomen is soft.      Tenderness: There is abdominal tenderness (mild epigastric). There is no guarding or rebound.   Musculoskeletal:      Comments: No chest ttp   Skin:     Comments: No rashes on chest   Neurological:      General: No focal deficit present.      Mental Status: She is alert and oriented to person, place, and time.   Psychiatric:         Mood and Affect: Mood normal.         Behavior: Behavior normal.           Results:   No testing ordered today    Assessment and Plan      Tiara is a 31 yo cisgender woman who presents with:    1. Gastroesophageal reflux disease, esophagitis presence not specified  Unlikely cardiac cause given patient age, symptom patter, exam and risk factors. Discussed patient may be clearing more secretions now with recent smoking cessation, although symptoms improving with pillows points to high liklihood of GERD/reflux component to chest discomfort. Congratulated on smoking cessation.    - famotidine (PEPCID) 40 MG tablet; Take 1 tablet (40 mg) by mouth daily  Dispense: 60 tablet; Refill: 3    2. Encounter for completion of form with patient  Filled out forms as best as able. BH addition does need to be signed by attending - will place in Dr Cullen's inbox and can be faxed back to company per cover sheet once completed. Will route chart to her.         There are no discontinued medications.    Options for treatment and follow-up care were reviewed with the patient. Tiara Guerreroelvia  engaged in the decision making process and verbalized understanding of the options discussed and agreed with the final plan.    DO [facility] Resident  PGY-2  [phone]

## 2019-12-13 NOTE — TELEPHONE ENCOUNTER
This provider placed an outreach call to the patient as she did not show for her scheduled appointment today 12/13/2019. Left a message requesting the patient call the clinic and re-schedule if she is interested. One additional outreach call will be placed by this provider. If that attempt is unsuccessful, no additional outreach attempts will be made unless contacted by another referring provider.     Kayla Francois, PhD

## 2019-12-14 ASSESSMENT — ANXIETY QUESTIONNAIRES: GAD7 TOTAL SCORE: 5

## 2019-12-20 ENCOUNTER — OFFICE VISIT (OUTPATIENT)
Dept: FAMILY MEDICINE | Facility: CLINIC | Age: 29
End: 2019-12-20
Payer: COMMERCIAL

## 2019-12-20 VITALS
HEART RATE: 67 BPM | BODY MASS INDEX: 20.17 KG/M2 | WEIGHT: 127 LBS | OXYGEN SATURATION: 99 % | DIASTOLIC BLOOD PRESSURE: 69 MMHG | TEMPERATURE: 98.1 F | SYSTOLIC BLOOD PRESSURE: 107 MMHG

## 2019-12-20 DIAGNOSIS — Z71.6 ENCOUNTER FOR SMOKING CESSATION COUNSELING: ICD-10-CM

## 2019-12-20 DIAGNOSIS — Z11.3 ROUTINE SCREENING FOR STI (SEXUALLY TRANSMITTED INFECTION): Primary | ICD-10-CM

## 2019-12-20 NOTE — PROGRESS NOTES
MEHRAN Andrade is a 29 year old  who presents for   Chief Complaint   Patient presents with     STD     Patient would like to be tested for HIV     STI screening:  Patient is an otherwise healthy 29-year-old female who is presenting today for completing her previous STI screening.  She is sexually active with one partner and uses condoms on a regular basis and has been active without any new partner the last year.  She is otherwise asymptomatic and was seen 6 months ago in clinic for routine STI testing at which point she underwent a gonorrhea screen only.  She plans on returning to clinic for the remainder of the blood tests which she would like to obtain today.    Smoking cessation nelsyifly discussed  6-7 cig/day to 2-3/day (cut down in 5/2019)    +++++++    Problem, Medication and Allergy Lists were reviewed and updated if needed..    Patient is an established patient of this clinic..         Review of Systems:   Review of Systems  Skin: negative except as above  Respiratory: negative except as above  Cardiovascular: negative except as above  Gastrointestinal: negative except as above  Genitourinary: negative except as above  Musculoskeletal: negative except as above  Neurologic: negative except as above  Psychiatric: negative except as above  Hematologic/Lymphatic/Immunologic: negative except as above  Endocrine: negative except as above         Physical Exam:     Vitals:    12/20/19 1334   BP: 107/69   BP Location: Left arm   Patient Position: Sitting   Cuff Size: Adult Regular   Pulse: 67   Temp: 98.1  F (36.7  C)   TempSrc: Oral   SpO2: 99%   Weight: 57.6 kg (127 lb)     Body mass index is 20.17 kg/m .  Vitals were reviewed and were normal     Physical Exam  Constitutional: Oriented to person, place, and time. Appears well-developed and well-nourished.   HENT:   Head: Normocephalic and atraumatic.   Eyes: Conjunctivae are normal.   Neck: Normal range of motion.   Cardiovascular: Normal rate,  "regular rhythm, normal heart sounds and intact distal pulses.    Pulmonary/Chest: Effort normal and breath sounds normal. No respiratory distress. No wheezes.   Musculoskeletal: Normal range of motion.   Neurological: Alert and oriented to person, place, and time.   Skin: Skin is warm and dry.   Psychiatric: Has a normal mood and affect. Behavior is normal. '      Results:      Results from this visit  Results for orders placed or performed in visit on 12/20/19   Treponema Abs w Reflex to RPR and Titer     Status: None   Result Value Ref Range    Treponema Antibodies Nonreactive NR^Nonreactive       Assessment and Plan        1. Routine screening for STI (sexually transmitted infection)  The remainder of her STI screening that was performed 6 months ago is completed today.  Due to otherwise asymptomatic and utilize STI precautions with condoms does not warrant any further work up at this time.   - Treponema Abs w Reflex to RPR and Titer  - HIV Antigen Antibody Combo  - Hepatitis B Surface Antibody  - Hepatitis B surface antigen  - Hepatitis C antibody    2. Encounter for smoking cessation counseling  Nearly 6 months ago patient had decreased her smoking habits from 6 cigarettes/day to nearly 2-3.  She has attempted nicotine patches in the past however feels as if it makes her in nauseous therefore has not pursued continued nicotine therapy.  I briefly spoke with her about other agents such as Chantix that may have great benefit for her.  She refused to trial this therapy today and is planning to follow-up with her primary care doctor for further management as she will try \" cold turkey first\".          There are no discontinued medications.    Options for treatment and follow-up care were reviewed with the patient. Tiara Andrade  engaged in the decision making process and verbalized understanding of the options discussed and agreed with the final plan.    Valentin Vasquez MD  "

## 2019-12-20 NOTE — PROGRESS NOTES
Preceptor Attestation:   Patient seen and discussed with the resident. Assessment and plan reviewed with resident and agreed upon.   Supervising Physician:  DO [facility]

## 2019-12-20 NOTE — LETTER
December 23, 2019      Tiara Andrade  [address] [address]        Dear Tiara,    Labs look good. Nothing abnormal.     Thank you for getting your care at [facility]. Please see below for your test results.    Resulted Orders   Treponema Abs w Reflex to RPR and Titer   Result Value Ref Range    Treponema Antibodies Nonreactive NR^Nonreactive   HIV Antigen Antibody Combo   Result Value Ref Range    HIV Antigen Antibody Combo Nonreactive NR^Nonreactive          Comment:      HIV-1 p24 Ag & HIV-1/HIV-2 Ab Not Detected   Hepatitis B Surface Antibody   Result Value Ref Range    Hepatitis B Surface Antibody 255.48 (H) <8.00 m[IU]/mL      Comment:      Reactive, Patient is considered to be immune to infection with hepatitis B   when the value is greater than or equal to 12.0 m[IU]/mL.     Hepatitis B surface antigen   Result Value Ref Range    Hep B Surface Agn Nonreactive NR^Nonreactive   Hepatitis C antibody   Result Value Ref Range    Hepatitis C Antibody Nonreactive NR^Nonreactive      Comment:      Assay performance characteristics have not been established for newborns,   infants, and children             Sincerely,    Valentin Vasquez MD

## 2019-12-21 LAB — T PALLIDUM AB SER QL: NONREACTIVE

## 2019-12-23 LAB
HBV SURFACE AB SERPL IA-ACNC: 255.48 M[IU]/ML
HBV SURFACE AG SERPL QL IA: NONREACTIVE
HCV AB SERPL QL IA: NONREACTIVE
HIV 1+2 AB+HIV1 P24 AG SERPL QL IA: NONREACTIVE

## 2020-02-25 NOTE — TELEPHONE ENCOUNTER
Called and left VM.   Needs clinic visit for follow up anxiety, happy to provide letter for work at that time (need to explain potential work implications that may trigger).   -Nona Cullen MD   Visited Mr Steven Romo in room [id number] for Palliative Care spiritual assessment. Mr Steven Romo was lying quietly in bed and did not respond when name was called; no family was present at time of visit. Consulted with patient's nurse who was present regarding his status. Unable to do spiritual assessment. Left note at bedside assuring patient and family of ongoing  availability for support. : . Suri Kelly. Johnna Nolan; [facility], to contact [phone] [address]ks call: [phone]

## 2020-07-15 ENCOUNTER — OFFICE VISIT (OUTPATIENT)
Dept: FAMILY MEDICINE | Facility: CLINIC | Age: 30
End: 2020-07-15
Payer: COMMERCIAL

## 2020-07-15 VITALS
OXYGEN SATURATION: 97 % | BODY MASS INDEX: 22.92 KG/M2 | HEIGHT: 66 IN | HEART RATE: 61 BPM | TEMPERATURE: 97.6 F | DIASTOLIC BLOOD PRESSURE: 65 MMHG | SYSTOLIC BLOOD PRESSURE: 96 MMHG | WEIGHT: 142.6 LBS

## 2020-07-15 DIAGNOSIS — R20.8 BURNING SENSATION OF FEET: ICD-10-CM

## 2020-07-15 DIAGNOSIS — Z00.00 ROUTINE HISTORY AND PHYSICAL EXAMINATION OF ADULT: Primary | ICD-10-CM

## 2020-07-15 DIAGNOSIS — Z72.0 VAPES NICOTINE CONTAINING SUBSTANCE: ICD-10-CM

## 2020-07-15 LAB
% GRANULOCYTES: 30.9 %G (ref 40–75)
GRANULOCYTES #: 1.2 K/UL (ref 1.6–8.3)
HBA1C MFR BLD: 5 % (ref 4.1–5.7)
HCT VFR BLD AUTO: 44 % (ref 35–47)
HEMOGLOBIN: 14.1 G/DL (ref 11.7–15.7)
LYMPHOCYTES # BLD AUTO: 2.2 K/UL (ref 0.8–5.3)
LYMPHOCYTES NFR BLD AUTO: 59 %L (ref 20–48)
MCH RBC QN AUTO: 29.5 PG (ref 26.5–35)
MCHC RBC AUTO-ENTMCNC: 32 G/DL (ref 32–36)
MCV RBC AUTO: 92.1 FL (ref 78–100)
MID #: 0.4 K/UL (ref 0–2.2)
MID %: 10.1 %M (ref 0–20)
PLATELET # BLD AUTO: 202 K/UL (ref 150–450)
RBC # BLD AUTO: 4.78 M/UL (ref 3.8–5.2)
TSH SERPL DL<=0.005 MIU/L-ACNC: 3.88 MU/L (ref 0.4–4)
VIT B12 SERPL-MCNC: 336 PG/ML (ref 193–986)
WBC # BLD AUTO: 3.8 K/UL (ref 4–11)

## 2020-07-15 ASSESSMENT — MIFFLIN-ST. JEOR: SCORE: 1379.58

## 2020-07-15 NOTE — PROGRESS NOTES
Female Physical Note        HPI       Concerns today: feet burning see below.     Due for pap.     Quit smoking 2 months ago! Worried about covid. Really wanted to quit too.   vapes occasionally - once a day maybe. Gets agitated easily without the nicotine and feels more depression.   Didn't like nicotine replacement patch- made her lightheaded.    Burning on bilateral calves and into feet, has cold feet - started since last Thursday  Never had this before  Tried lotion for extreme dryness not helping much.   Bothers her at night when in bed  Never happens when standing up or during day  No burning anywhere else in body  No rash on legs  Doesn't itch  Has been walking and exercising more lately - 5 miles daily since June    Family history of DM: dad's parents  Mom's mother    Patient Active Problem List   Diagnosis     JASON (generalized anxiety disorder)     Dysmenorrhea     Dyspareunia in female     Vapes nicotine containing substance     Past Medical History:   Diagnosis Date     ADHD      Anemia      Anxiety      Previous Medical Care   none  Family History   Problem Relation Age of Onset     Diabetes Maternal Grandmother      Hypertension Maternal Grandmother      Coronary Artery Disease Other      Hyperlipidemia No family hx of      Breast Cancer No family hx of      Cerebrovascular Disease No family hx of      Colon Cancer No family hx of      Prostate Cancer No family hx of      Other Cancer No family hx of      Anxiety Disorder No family hx of      Depression No family hx of      Mental Illness No family hx of      Substance Abuse No family hx of           Review of Systems:     Review of Systems:  CONSTITUTIONAL: NEGATIVE for fever, chills, change in weight  INTEGUMENTARY/SKIN: NEGATIVE for worrisome rashes, moles or lesions  EYES: NEGATIVE for vision changes  ENT/MOUTH: NEGATIVE for ear, mouth and throat problems  RESP: NEGATIVE for significant cough or SOB  BREAST: NEGATIVE for masses, tenderness  CV:  NEGATIVE for chest pain, palpitations  GI: NEGATIVE for nausea, abdominal pain, heartburn  : NEGATIVE for frequency, dysuria  MUSCULOSKELETAL see hpi   NEURO: NEGATIVE for weakness, dizziness or paresthesias  HEME/ALLERGY: NEGATIVE for bleeding problems  PSYCHIATRIC: NEGATIVE for changes in mood or affect  Sleep  Do you snore most or the night (as reported by a family member)? Sometimes when really tired or congested  Do you feel sleepy or extremely tired during most of the day? No       Social History     Social History     Socioeconomic History     Marital status: Single     Spouse name: Not on file     Number of children: Not on file     Years of education: Not on file     Highest education level: Not on file   Occupational History     Not on file   Social Needs     Financial resource strain: Not on file     Food insecurity     Worry: Not on file     Inability: Not on file     Transportation needs     Medical: Not on file     Non-medical: Not on file   Tobacco Use     Smoking status: Former Smoker     Packs/day: 0.25     Types: Cigarettes     Last attempt to quit: 2019     Years since quittin.6     Smokeless tobacco: Never Used   Substance and Sexual Activity     Alcohol use: No     Drug use: Not Currently     Types: Marijuana     Comment: once in a while     Sexual activity: Yes     Partners: Male     Birth control/protection: Condom   Lifestyle     Physical activity     Days per week: Not on file     Minutes per session: Not on file     Stress: Not on file   Relationships     Social connections     Talks on phone: Not on file     Gets together: Not on file     Attends Christianity service: Not on file     Active member of club or organization: Not on file     Attends meetings of clubs or organizations: Not on file     Relationship status: Not on file     Intimate partner violence     Fear of current or ex partner: Not on file     Emotionally abused: Not on file     Physically abused: Not on file      "Forced sexual activity: Not on file   Other Topics Concern     Not on file   Social History Narrative     Not on file     Marital Status:Partnered  Who lives in your household? Patient's sister    Has anyone hurt you physically, for example by pushing, hitting, slapping or kicking you or forcing you to have sex? Denies  Do you feel threatened or controlled by a partner, ex-partner or anyone in your life? Denies  Sexual Health   Sexual concerns: ongoing/chronic pain during intercourse- tried lube (helps a little bit), hx of leiomyoma.   STI History: Neg  Pregnancy History:   Regular, but pretty painful. Takes ibuprofen. Has tried OCPs and nexplanon and considered IUD, but neither worked for her. Currently using condoms for contraception. Does not want to make any changes to this today  LMP 20   Last Pap Smear Date:   Lab Results   Component Value Date    PAP NIL 2017     Abnormal Pap History: None  Recommended Screening   Pap/HPV cotest every 5 years for women 30-65   Patient wants to do pap at upcoming appointment with Dr. Cullen.   No hx of breast cancer in family members.        Physical Exam:   Vitals: BP 96/65 (BP Location: Left arm, Patient Position: Sitting, Cuff Size: Adult Regular)   Pulse 61   Temp 97.6  F (36.4  C) (Tympanic)   Ht 1.67 m (5' 5.75\")   Wt 64.7 kg (142 lb 9.6 oz)   LMP 2020   SpO2 97%   Breastfeeding No   BMI 23.19 kg/m    BMI= Body mass index is 23.19 kg/m .   GENERAL: healthy, alert and no distress  EYES: Eyes grossly normal to inspection, extraocular movements - intact, and PERRL  HENT: ear canals- normal; TMs- normal; Nose- normal; Mouth- no ulcers, no lesions  NECK: no tenderness, no adenopathy, no asymmetry, no masses, no stiffness; thyroid- normal to palpation  RESP: lungs clear to auscultation - no rales, no rhonchi, no wheezes  BREAST: no masses, no tenderness, no nipple discharge, no palpable axillary masses or adenopathy  CV: regular rates and " rhythm, normal S1 S2, no S3 or S4 and no murmur, no click or rub -  ABDOMEN: soft, no tenderness, no masses, normal bowel sounds  MS: extremities- no gross deformities noted, no edema  SKIN: no suspicious lesions, no rashes  NEURO: strength and tone- normal, sensory exam- grossly normal, mentation- intact, speech- normal, reflexes- symmetric  BACK: no paralumbar tenderness  - female: cervix- normal, adnexae- normal; uterus- normal, no masses, no discharge  PSYCH: Alert and oriented times 3; speech- coherent , normal rate and volume; able to articulate logical thoughts, able to abstract reason, no tangential thoughts, no hallucinations or delusions, affect- normal  LYMPHATICS: ant. cervical- normal, post. cervical- normal, axillary- normal, supraclavicular- normal  Assessment and Plan   Tiara was seen today for physical and leg problem.    Diagnoses and all orders for this visit:    Routine history and physical examination of adult  -     Pap imaged thin layer screen with HPV - recommended age 30 - 65 years (select HPV order below)  -     HPV High Risk Types DNA Cervical    Vapes nicotine containing substance  Quit cigarettes 2 months ago! Now vaping daily. Discussed- patient is contemplative. She wants the nicotine gum again- the patch made her too lighteaded- likely then too high of a dose. She will set a quit date. Bring up at next visit.   -     nicotine (NICORETTE) 2 MG gum; Place 1 each (2 mg) inside cheek as needed for smoking cessation    Burning sensation of feet  HDS, exam is totally normal. She reports a hx of anemia. Will rule out some causes of neuropathy/ burning sensation today. Does have family hx of DM.  -     Hemoglobin A1c ([facility])  -     Vitamin B12  -     TSH with free T4 reflex  -     HIV Antigen Antibody Combo  -     Treponema Abs w Reflex to RPR and Titer  -     CBC with Diff Plt ([facility])    1. Health Care Maintenance: Normal Physical Exam    1. Routine follow up in one  year.  2.Contraception: Details: none at this time, will continue condoms.    Options for treatment and follow-up care were reviewed with the patient . Tiara Andrade and/or guardian engaged in the decision making process and verbalized understanding of the options discussed and agreed with the final plan.    Sher Yoo MD

## 2020-07-16 LAB
HIV 1+2 AB+HIV1 P24 AG SERPL QL IA: NONREACTIVE
T PALLIDUM AB SER QL: NONREACTIVE

## 2020-07-20 LAB
COPATH REPORT: NORMAL
PAP: NORMAL

## 2020-07-21 LAB
FINAL DIAGNOSIS: NORMAL
HPV HR 12 DNA CVX QL NAA+PROBE: NEGATIVE
HPV16 DNA SPEC QL NAA+PROBE: NEGATIVE
HPV18 DNA SPEC QL NAA+PROBE: NEGATIVE
SPECIMEN DESCRIPTION: NORMAL
SPECIMEN SOURCE CVX/VAG CYTO: NORMAL

## 2020-07-23 ENCOUNTER — TELEPHONE (OUTPATIENT)
Dept: FAMILY MEDICINE | Facility: CLINIC | Age: 30
End: 2020-07-23

## 2020-07-23 NOTE — TELEPHONE ENCOUNTER
Spoke with patient and relayed information below. She verbalized understanding. She stated that she has decreased the length of her walks and that has helped with her symptoms.    Sheyla Samayoa RN

## 2020-07-23 NOTE — TELEPHONE ENCOUNTER
"From Dr. Yoo \"Please call patient with the following message: Your pap is normal. Your labs are also reassuring and show no clear reason as to why your feet are burning. Please return to clinic if your symptoms do not improve.\"    Left a message to return call. Please transfer to any available RN when patient returns call, thank you!    Sheyla Samayoa RN    "

## 2020-09-15 NOTE — TELEPHONE ENCOUNTER
RN returned call to patient. Spoke to patient 2/20/18 about symptoms. Patient no-showed appointment on 2/23/18.    Patient states she has chills, nausea (past two days), reports one episode of emesis, lower back pain for the past three weeks, past reports previous breast tenderness, fatigue, and spotting (past two months since starting birth control). Patient reports last menstrual period 2/25/18 and intercourse 2/16/18.     Patient has not tried anything at home to help with symptoms. Denies anyone else being sick in the home. Discussed fluids, bland foods, and good handwashing. Try Tylenol and heat pad to help with lower back pain. Patient states she would like to discuss discontinuing birth control. Advised patient to schedule an appointment to discuss alternative options. Patient in agreement with plan. Transferred to  to schedule.    Jenn Blackwood RN     Vascular Surgery Outpatient Progress Note    Chief Complaint   Patient presents with    Circulatory Problem     Follow up carotid stenosis     HISTORY OF PRESENT ILLNESS:                The patient is a 62 y.o. male who returns for follow-up evaluation of carotid artery disease s/p R CEA 1/2019. He denies any symptoms of stroke, ministroke, or amaurosis fugax. He was diagnosed with bladder CA in the Spring and had tumor resection in 6/2020. He notes the prognosis is very good. Otherwise, he is doing very well. Past Medical History:        Diagnosis Date    Acid reflux     Bladder cancer (HCC)     Chest pain     COPD (chronic obstructive pulmonary disease) (HCC)     Hiatal hernia     Hyperlipidemia     Hypertension     Sleep apnea      Past Surgical History:        Procedure Laterality Date    BLADDER TUMOR EXCISION  05/28/2020    CAROTID ENDARTERECTOMY Right 01/31/2019    Anton Chávez    CAROTID ENDARTERECTOMY Right 1/31/2019    RIGHT CAROTID ENDARTERECTOMY performed by Jesica Dominguez MD at [facility] CATH LAB PROCEDURE  5/17/07    [facility], Dr. Basilia Bee: Normal Left Ventriculogram, Angiographically Normal Epicardial Coronary Arteries    FOOT SURGERY      growth removed    HERNIA REPAIR Right     HIATAL HERNIA REPAIR  2013     Current Medications:   Prior to Admission medications    Medication Sig Start Date End Date Taking?  Authorizing Provider   amLODIPine (NORVASC) 5 MG tablet Take 5 mg by mouth daily 9/7/20  Yes Historical Provider, MD Dakota Knutson 200-25 MCG/INH AEPB inhaler  9/6/20  Yes Historical Provider, MD   loratadine (CLARITIN) 10 MG tablet Take 10 mg by mouth daily 8/6/20  Yes Historical Provider, MD   montelukast (SINGULAIR) 10 MG tablet Take 10 mg by mouth daily 6/15/20  Yes Historical Provider, MD   pantoprazole (PROTONIX) 40 MG tablet Take 40 mg by mouth daily as needed 8/6/20  Yes Historical Provider, MD   losartan (COZAAR) 50 MG tablet Take 1 tablet by mouth daily 1/10/19  Yes Cristian Randhawa MD   aspirin EC 81 MG EC tablet Take 1 tablet by mouth daily  Patient taking differently: Take 81 mg by mouth daily LD  PER PT 1/10/19  Yes Cristian Randhawa MD   atorvastatin (LIPITOR) 40 MG tablet Take 0.5 tablets by mouth daily  Patient taking differently: Take 40 mg by mouth nightly  1/10/19  Yes Cristian Randhawa MD   Cholecalciferol (VITAMIN D3) 2000 units CAPS Take 1 capsule by mouth every morning LD 19   Yes Historical Provider, MD   albuterol (PROAIR HFA) 108 (90 BASE) MCG/ACT inhaler Inhale 2 puffs into the lungs every 6 hours as needed for Wheezing Instructed to take am of procedure IF NEEDED AND BRING   Yes Historical Provider, MD     Allergies:  Patient has no known allergies.     Social History     Socioeconomic History    Marital status:      Spouse name: Not on file    Number of children: Not on file    Years of education: Not on file    Highest education level: Not on file   Occupational History    Not on file   Social Needs    Financial resource strain: Not on file    Food insecurity     Worry: Not on file     Inability: Not on file    Transportation needs     Medical: Not on file     Non-medical: Not on file   Tobacco Use    Smoking status: Current Some Day Smoker     Packs/day: 5.00     Years: 25.00     Pack years: 125.00     Types: Cigars     Last attempt to quit: 2007     Years since quittin.4    Smokeless tobacco: Never Used    Tobacco comment: smokes 5/DAY   Substance and Sexual Activity    Alcohol use: Yes     Comment: SOCIALLY    Drug use: No    Sexual activity: Not on file   Lifestyle    Physical activity     Days per week: Not on file     Minutes per session: Not on file    Stress: Not on file   Relationships    Social connections     Talks on phone: Not on file     Gets together: Not on file     Attends Pent[employer]al service: Not on file     Active member of club or organization: Not on file     Attends meetings of clubs or organizations: Not on file     Relationship status: Not on file    Intimate partner violence     Fear of current or ex partner: Not on file     Emotionally abused: Not on file     Physically abused: Not on file     Forced sexual activity: Not on file   Other Topics Concern    Not on file   Social History Narrative    Not on file        Family History   Problem Relation Age of Onset    Emphysema Mother     Emphysema Father     High Blood Pressure Father     Diabetes Father     High Blood Pressure Brother      PHYSICAL EXAM:  There were no vitals filed for this visit. General Appearance: alert and oriented to person, place and time, in no acute distress, well developed and well- nourished  Neurologic: no cranial nerve deficit, speech normal  Head: normocephalic and atraumatic  Eyes: extraocular eye movements intact, conjunctivae normal  ENT: external ear and ear canal normal bilaterally, nose without deformity  Pulmonary/Chest: normal air movement, no respiratory distress  Cardiovascular: normal rate, regular rhythm  Abdomen: non-distended, no masses  Musculoskeletal: no joint deformity or tenderness  Extremities: no leg edema bilaterally  Skin: warm and dry, no rash or erythema    PULSE EXAM      Right      Left   Brachial     Radial 2 2   Femoral     Popliteal     Dorsalis Pedis     Posterior Tibial 2 2   (3=normal, 2=diminished, 1=barely palpable, 4=widened)    RADIOLOGY: Carotid US:  Right 162 / 42 (1.1), wnl. Left 156 / 55 (1.1), 50-69%. Problem List Items Addressed This Visit     None      Visit Diagnoses     Bilateral carotid artery stenosis    -  Primary    Relevant Orders    US CAROTID ARTERY BILATERAL        I reviewed the US results with the patient. Stable carotid stenosis s/p R CEA in 1/2019. I reviewed the natural history and treatment options of carotid artery disease.   I reviewed the signs and symptoms of stroke, and instructions

## 2020-12-04 DIAGNOSIS — F41.1 GAD (GENERALIZED ANXIETY DISORDER): ICD-10-CM

## 2020-12-04 NOTE — TELEPHONE ENCOUNTER
Hydroxyzine 25mg last filled 6/29/2020 for #30    Propranolol 40mg last filled 6/29/2020 for #30    Thank you,    Mariia Grullon, PharmD  [facility]  [phone]

## 2020-12-08 RX ORDER — HYDROXYZINE HYDROCHLORIDE 25 MG/1
25 TABLET, FILM COATED ORAL EVERY 6 HOURS PRN
Qty: 30 TABLET | Refills: 11 | Status: SHIPPED | OUTPATIENT
Start: 2020-12-08 | End: 2020-12-11

## 2020-12-08 RX ORDER — PROPRANOLOL HYDROCHLORIDE 40 MG/1
40 TABLET ORAL DAILY
Qty: 30 TABLET | Refills: 11 | Status: SHIPPED | OUTPATIENT
Start: 2020-12-08

## 2020-12-11 ENCOUNTER — VIRTUAL VISIT (OUTPATIENT)
Dept: FAMILY MEDICINE | Facility: CLINIC | Age: 30
End: 2020-12-11
Payer: COMMERCIAL

## 2020-12-11 DIAGNOSIS — F41.0 PANIC ATTACK: ICD-10-CM

## 2020-12-11 DIAGNOSIS — Z71.6 ENCOUNTER FOR SMOKING CESSATION COUNSELING: ICD-10-CM

## 2020-12-11 DIAGNOSIS — F41.1 GAD (GENERALIZED ANXIETY DISORDER): Primary | ICD-10-CM

## 2020-12-11 PROCEDURE — 99213 OFFICE O/P EST LOW 20 MIN: CPT | Mod: 95 | Performed by: STUDENT IN AN ORGANIZED HEALTH CARE EDUCATION/TRAINING PROGRAM

## 2020-12-11 RX ORDER — HYDROXYZINE HYDROCHLORIDE 25 MG/1
50 TABLET, FILM COATED ORAL EVERY 6 HOURS PRN
Start: 2020-12-11

## 2020-12-11 RX ORDER — ESCITALOPRAM OXALATE 5 MG/1
5 TABLET ORAL DAILY
Status: CANCELLED | OUTPATIENT
Start: 2020-12-11

## 2020-12-11 ASSESSMENT — ANXIETY QUESTIONNAIRES
6. BECOMING EASILY ANNOYED OR IRRITABLE: SEVERAL DAYS
GAD7 TOTAL SCORE: 9
5. BEING SO RESTLESS THAT IT IS HARD TO SIT STILL: NOT AT ALL
7. FEELING AFRAID AS IF SOMETHING AWFUL MIGHT HAPPEN: NEARLY EVERY DAY
IF YOU CHECKED OFF ANY PROBLEMS ON THIS QUESTIONNAIRE, HOW DIFFICULT HAVE THESE PROBLEMS MADE IT FOR YOU TO DO YOUR WORK, TAKE CARE OF THINGS AT HOME, OR GET ALONG WITH OTHER PEOPLE: VERY DIFFICULT
2. NOT BEING ABLE TO STOP OR CONTROL WORRYING: NOT AT ALL
1. FEELING NERVOUS, ANXIOUS, OR ON EDGE: SEVERAL DAYS
3. WORRYING TOO MUCH ABOUT DIFFERENT THINGS: SEVERAL DAYS

## 2020-12-11 ASSESSMENT — PATIENT HEALTH QUESTIONNAIRE - PHQ9: 5. POOR APPETITE OR OVEREATING: NEARLY EVERY DAY

## 2020-12-11 NOTE — PROGRESS NOTES
"Family Medicine Telephone Visit Note               Telephone Visit Consent   Patient was verbally read the following and verbal consent was obtained.    \"Telephone visits are billed at different rates depending on your insurance coverage. During this emergency period, for some insurers they may be billed the same as an in-person visit.  Please reach out to your insurance provider with any questions.  If during the course of the call the physician/provider feels a telephone visit is not appropriate, you will not be charged for this service.\"    Name person giving consent:  Patient   Date verbal consent given:  12/11/2020  Time verbal consent given:  4:07 PM      Chief Complaint   Patient presents with     RECHECK     follow up Anxiety             HPI   Patients name: Tiara  Appointment start time:  4:10 PM    1. Smoking: Nicotine patch, wants the lower dose.  Quit smoking, started vaping. Wants to stop. Vaping 5 mg daily. 5%   Was given the gum, prefers  Patch.   The high dose made her jumpy.  Quit date: 12/21/2020    Anxiety follow up  Doesn't want an everyday med, wants something to use as needed. Wants alprazolam     Your mood since your last visit: Worsened with lots of panic attacks.     Medication? Hydroxyzine here and there, makes her a little sleepy  Panic attacks: random every week- 2 weeks. Sweating, heart palpitations, dizzy, shortness of breath and scared.     Therapy? In the past, but has not seen anyone since the pandemic.     Exercise? Nothing since the pandemic       Recent PHQ-9 Scores:     PHQ-9 SCORE 10/19/2019 11/8/2019 12/13/2019   PHQ-9 Total Score 7 6 1     Recent JASON-7 Scores:      JASON-7 SCORE 11/8/2019 12/13/2019 12/11/2020   Total Score 18 5 9         Today' sPHQ 9 Reviewed and it is 9 worsening        Current Outpatient Medications   Medication Sig Dispense Refill     hydrOXYzine (ATARAX) 25 MG tablet Take 2 tablets (50 mg) by mouth every 6 hours as needed for anxiety or other (panic)       " "nicotine (NICODERM CQ) 7 MG/24HR 24 hr patch Place 1 patch onto the skin every 24 hours 30 patch 1     nicotine (NICORETTE) 2 MG gum Place 1 each (2 mg) inside cheek as needed for smoking cessation 60 tablet 1     propranolol (INDERAL) 40 MG tablet Take 1 tablet (40 mg) by mouth daily 60-90 minutes prior to stress-inducing event 30 tablet 11     Ulipristal Acetate (KARLEE) 30 MG tablet Take 1 tablet (30 mg) by mouth once for 1 dose 1 tablet 3     No Known Allergies           Review of Systems:     Constitutional, HEENT, cardiovascular, pulmonary, gi and gu systems are negative, except as otherwise noted.         Physical Exam:     There were no vitals taken for this visit.  Estimated body mass index is 23.19 kg/m  as calculated from the following:    Height as of 7/15/20: 1.67 m (5' 5.75\").    Weight as of 7/15/20: 64.7 kg (142 lb 9.6 oz).    Exam:  Constitutional: healthy, alert and no distress  Psychiatric: mentation appears normal and affect normal/bright        Assessment and Plan       ICD-10-CM    1. JASON (generalized anxiety disorder)  F41.1 BEHAVIORAL HEALTH REFERRAL ([place] interal and external)     hydrOXYzine (ATARAX) 25 MG tablet   2. Encounter for smoking cessation counseling  Z71.6 nicotine (NICODERM CQ) 7 MG/24HR 24 hr patch   3. Panic attack  F41.0        JASON and panic attacks  Worsening recently, JASON-7: 9  - recommended selective serotonin reuptake inhibitor, patient declined (not interested in daily medication)  - increase dose of hydroxyzine to 50mg for panic attacks  -  referral for therapy  - follow-up in 1 month    Smoking cessation  Quit date 12/21/2020  - nicotine patch (low dose as patient has been jittery on higher doses)  - nicotine gum    Refilled medications that would be required in the next 3 months.       Appointment end time: 4:30  This is a telephone visit that took 20 minutes.      [facility]     Karlie Friedman MD  I precepted today with " Dr. Yeboah.

## 2020-12-12 ASSESSMENT — ANXIETY QUESTIONNAIRES: GAD7 TOTAL SCORE: 9

## 2020-12-14 ENCOUNTER — TELEPHONE (OUTPATIENT)
Dept: PSYCHOLOGY | Facility: CLINIC | Age: 30
End: 2020-12-14

## 2020-12-26 NOTE — PROGRESS NOTES
Preceptor Attestation:   Patient discussed with the resident. I have verified the content of the note, which accurately reflects my assessment of the patient and the plan of care.   Supervising Physician:  Tanya Yeboah DO

## 2020-12-27 ENCOUNTER — HEALTH MAINTENANCE LETTER (OUTPATIENT)
Age: 30
End: 2020-12-27

## 2021-01-11 ENCOUNTER — VIRTUAL VISIT (OUTPATIENT)
Dept: PSYCHOLOGY | Facility: CLINIC | Age: 31
End: 2021-01-11
Payer: COMMERCIAL

## 2021-01-11 DIAGNOSIS — F41.0 PANIC DISORDER: ICD-10-CM

## 2021-01-11 DIAGNOSIS — F41.1 GAD (GENERALIZED ANXIETY DISORDER): Primary | ICD-10-CM

## 2021-01-11 PROCEDURE — 90791 PSYCH DIAGNOSTIC EVALUATION: CPT | Mod: U7 | Performed by: STUDENT IN AN ORGANIZED HEALTH CARE EDUCATION/TRAINING PROGRAM

## 2021-01-11 NOTE — Clinical Note
Terrence Friedman, here's Tiara's note from yesterday. I think a psychiatry consult with Dr. Benjamin is warranted based on the exacerbation of her symptoms. Once you put in the referral we can ask the  to schedule. Let me know what you think. Thanks! - Lucina

## 2021-01-11 NOTE — PROGRESS NOTES
"The following statement was read to the patient at the outset of this visit:     \"We have found that certain health care needs can be provided without the need for a face to face visit.  This service lets us provide the care you need with a phone conversation. I will have full access to your [facility] medical record during this entire phone call.   I will be taking notes for your medical record.  Since this is like an office visit, we will bill your insurance company for this service. There are potential benefits and risks of telephone visits (e.g. limits to patient confidentiality) that differ from in-person visits.?  Confidentiality still applies for telephone services, and nobody will record the visit.  It is important to be in a quiet, private space that is free of distractions (including cell phone or other devices) during the visit.??If during the course of the call I believe a telephone visit is not appropriate, you will not be charged for this service.\"     Consent has been obtained for this service by care team member: Yes     Emergency contact: Sister Logan David - [phone]  [facility] Emergency Room: [facility] Emergency Room  Location at time of call: Home    Start of call: 1:20 pm  End of call: 2:03 pm      Behavioral Health Diagnostic Assessment:    Client's Legal Name: Tiara Andrade    Client's Preferred Name: Tiara  YOB: 1990    Appointment was: scheduled  Others present: none   Duration: 43 minutes  Client was: on time    Assessment Summary:  Diagnostic completed today. Tiara is a 31 year old Mong[place]ian female who was referred for mental health services for help with symptoms of anxiety and panic attacks. Based on the patient's report of symptoms, she likely meets criteria for generalized anxiety disorder and panic disorder. She is starting to present symptoms of agoraphobia as well and further assessment is warranted. The patient's mental health concerns have been " affecting her ability to function in various domains (e.g., home and work) and are causing clinically significant distress. The patient has no concerns with drug or alcohol use. The patient is also struggling with managing work responsibilities and leaving her home due to anxiety. Tiara denied any safety concerns, SI or HI. Based on the patient's reported symptoms and impact on functioning, the plan for the patient is to initiate psychotherapy services at [facility] for support with anxiety and panic attacks. .    DSM-V Diagnoses:  Generalized anxiety disorder   Panic disorder  R/O Agoraphobia    Orientation, Recommendations, & Plan:     Rights to and limits to confidentiality were discussed with patient.    Integrated care team and shared chart were discussed with patient and agreed upon.    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    Patient was given informational handout on postdoctoral fellow status and was invited to ask questions.    Follow-up in 2 weeks to establish regular psychotherapy to address anxiety and panic attacks.    Goals for therapy = Reduce panic attacks    Patient is interested in psychiatric consult for medication options. Consulted with Dr. Friedman and routing note to her to make referral.     Mental Health Screening Questionnaires:    PHQ-9 SCORE 10/19/2019 11/8/2019 12/13/2019   PHQ-9 Total Score 7 6 1     JASON-7 SCORE 11/8/2019 12/13/2019 12/11/2020   Total Score 18 5 9       PTSD-PC = 0/4  CAGE AID:  0/4     Current Presenting Problems or Complaints (including patient perception of problem and external factors contributing to current dilemma): Tiara was referred to behavioral health services for symptoms of anxiety and panic attacks by Dr. Friedman. This provider met with her for an initial visit on 11/02/2019 after being referred by Dr. Cano. Did not follow up with therapy until new referral by Dr. Friedman on 12/11/2020. Patient has struggled with anxiety per chart  since 2018. During initial visits reported symptoms consistent with generalized anxiety disorder with panic attacks for the past 2 years. These were occurring more and more. She was starting to develop fears of leaving her home or driving her car. Since then, these symptoms have exacerbated during the pandemic and with working from home. She reported unexpected panic attacks during the day. She was excessive worries that something bad will happen or she will have a panic attack for no reason. These have generalized to excessive fear of leaving her home and driving. She now needs someone with her at all times if she leaves her house for an errand or gets in the car. She is avoiding driving, asking others to drive for her. Is afraid now to be by herself in the home as well.     Patient had been taking hydroxyzine and propanolol for anxiety. She has declined starting an selective serotonin reuptake inhibitor which has been recommended by medical providers. She wants to take something as needed and not a daily medication. She does not like taking the hydroxyzine because it makes her sleepy, she can no longer work during the day if she takes it. She worries that the propanolol will have a negative effect on her heart. She feels like these medications no longer work for her.     At a wedding last October, she started having a panic attack and strong anxiety. A friend offered her half a tab of her own medication for anxiety. It seems this was Xanax. Tiara said her anxiety went away and she felt very calm and enjoyed the rest of the day. She did not feel sleepy, just calm for the next few days. She would like to be prescribed Xanax. She requested this from Dr. Friedman who increased her hydroxyzine and referred for therapy.    Extensive time was taken today answering questions and providing psychoeducation on medications for anxiety and anxiety disorders/panic attacks. Discussed benefits of selective serotonin reuptake  "inhibitor and shared information on benzodiazepines. Tiara is heavily focused on finding a medication that works for her and seems ambivalent that psychotherapy will help, although I did share that psychotherapy has been found to be most helpful for panic, agoraphobia, and anxiety. It seems that although Tiara was previously having symptoms of JASON, these have now worsened onto panic disorder and agoraphobia. She is very distressed by these. She wonders why this is happening to her since she \"never\" had anxiety before. She is afraid that her life is becoming more and more limited. She thinks that Xanax will give her a sense of safety - \"knowing that there's something I can take if I really need it\" - which will be enough for managing panic attacks better.    Review of Symptoms:    Depression: None identified, most symptoms better explained by anxiety - fatigue, difficulty concentrating, restlessness.     Loreto: None identified    Psychosis: None identified    Anxiety: Excessive generalized worries, daily anxiety, panic attacks, fear that something awful will happen, difficulty sleeping and concentrating. Unexpected and expected panic attacks. Intense fears of leaving her home and driving car. Safety behaviors in order to leave home despite intense anxiety. Unclear if agoraphobia-like symptoms have met 6 month criteria.      Post Traumatic Stress Disorder: None identified.    Functioning: Daily panic attacks and anxiety. Increased avoidance of activities that result in panic. Avoidance of leaving home and driving. Can only tolerate with safety behaviors (e.g., company of others). Difficulty concentrating and fulfilling work responsibilities.     Patient Strengths and Resources: Organized and hard-working.     Previous Mental Health Concerns/Treatment:    Outpatient:  None    Inpatient:  None    Chemical Health History:  Alcohol Use: None  Drug Use: None. Has tried marijuana in the past, but makes her feel " paranoid.  Prescription Misuse: None  Tobacco Use: Former smoker    Have you ever thought about cutting down your drug/alcohol use?  No  Do you ever get annoyed when people ask you about your drug/alcohol use:  No  Do you ever feel guilty about your drug/alcohol use:  No  Do you ever drink alcohol or use drugs in the morning:  No    Patient past attempts to control alcohol/drug use (including informal approaches or formal treatment): N/A  Have there been any consequences related to clients drug use?  no.    Mental Status Exam:  Appearance:  Unable to assess due to phone visit  Behavior/Relationship to Examiner/Demeanor:  Cooperative, Engaged and Pleasant  Build:   Unable to assess due to phone visit  Gait:   Unable to assess due to phone visit  Psychomotor Activity:   Unable to assess due to phone visit  Speech rate:  Rapid  Speech volume:  Normal  Speech coherence:  Normal  Speech spontaneity:  Normal  Mood (subjective report):  anxious  Affect (objective appearance):  Tearful and Anxious/Nervous  Eye Contact:  Unable to assess due to phone visit  Thought Process (Associations):  Logical, Linear and Goal directed  Thought process (Rate):  Normal  Abnormal Perception:  None  Sensorium:  Alert  Attention/Concentration:  Normal  Insight:  Good  Judgment:  Good    Suicide Assessment:  Recent suicidal thoughts:  No  Past suicidal thoughts:  No  Any attempts in the past:  No  Any family/friends/loved ones die by suicide:  No  Plan or considering various methods:  No  Access to firearms:  No  Protective factors:  no h/o suicide attempt, no plan or intent, h/o seeking help when needed, future oriented, none to minimal alcohol use , commitment to family, good social support  , stable housing and good job situation  Verbal contract for safety:  Yes    Non-Suicidal Self Injurious Behavior:  No    Violence/Homicide Risk Assessment:  Problems with anger management:  No  History of violence:  No  History of significant damage to  property:  No  Threat made to harm or kill someone:  No  Verbal contract for safety:  N/A    Safety Plan:   Tiara was provided with the phone number for emergency mental health services and was encouraged to call these local crisis numbers, 911, or visit a local emergency room if thoughts of suicide or homicide were to arise and/or if they were to be in acute distress. The patient agreed to utilize these services as indicated if the need were to arise. Tiara denied past or current suicidal or homicidal ideation, intent, or plan, denied a history of suicide attempts/self-harming behaviors, and identified her family as  primary protective factor(s) to reduce risk of self-harm or causing harm to others. Based on these factors, Tiara is considered to be sustainable as an outpatient at this time.     Social History and Associated Level of Functioning (See below):    Current Living Arrangements: Lives in an apartment.     Family/Children: No children of her own. Family is supportive.     Intimate Relationship/Marriage: Single.     Social Connection: Some close friends.    Developmental History: WNL. Born in [facility].     Abuse/Trauma: Will be further assessed.     Work: Works in [employer] service. Working from home since March 2020.     Education: Went to college in [place]. Studied international studies.     Legal: None identified    Cultural/Belief System: Will be further assessed.     Personal Health:     Patient Active Problem List   Diagnosis     JASON (generalized anxiety disorder)     Dysmenorrhea     Dyspareunia in female     Vapes nicotine containing substance     Panic attack     Current Outpatient Medications   Medication     hydrOXYzine (ATARAX) 25 MG tablet     nicotine (NICODERM CQ) 7 MG/24HR 24 hr patch     nicotine (NICORETTE) 2 MG gum     propranolol (INDERAL) 40 MG tablet     Ulipristal Acetate (KARLEE) 30 MG tablet     No current facility-administered medications for this visit.        Family Health History:  "Grandmother with diabetes and hypertension.     NOTE: Diagnostic complete.     Kayla Francois, PhD      Additional Screening:  Primary Care PTSD Screen:  In your life, have you ever had any experience that was so frightening, horrible or upsetting that, in the past month, you...    1. Have had nightmares about it or thought about it when you did not want to?  No  2. Tried hard not to think about it or went out of your way to avoid situations that remind you of it?  No  3. Were constantly on guard, watchful, or easily startled?  No  4. Felt numb or detached from others, activities, or your surroundings?  No    Current research suggests that the results of the PC-PTSD should be considered \"positive\" if a patient answers \"yes\" to any (3) items.    References    SHANDA Doyle., SHAW Walsh, Kimerling, R., YOSELIN Mcgill., BARI Gupta., MIGUEL Cooley., MATEO Jaramillo, STEPHANE Kelly, & Gil, J.I. (2004). The primary care PTSD screen (PC-PTSD): Development and operating characteristics. Primary Care Psychiatry, 9, 9-14.    "

## 2021-01-13 ENCOUNTER — TELEPHONE (OUTPATIENT)
Dept: PSYCHOLOGY | Facility: CLINIC | Age: 31
End: 2021-01-13

## 2021-01-13 DIAGNOSIS — F41.0 PANIC ATTACK: ICD-10-CM

## 2021-01-13 DIAGNOSIS — F41.1 GAD (GENERALIZED ANXIETY DISORDER): Primary | ICD-10-CM

## 2021-01-13 NOTE — TELEPHONE ENCOUNTER
Psychiatry Consult Referral:  Please schedule this patient with Dr. Benjamin.  Check with the patient if they are able to do a video visit.  They will need access to either a smartphone or a laptop with camera/microphone.  Please talk to patient about ability to do video visits.  These consults should really be done as a video visit if at all possible.  If patient does not have access to technology, we can offer them to come into clinic to be at one of our computers to do the video visit with the psychiatrist.      This is for a one time consult only, not for ongoing psychiatric care.  She will provide recommendations to the PCP for ongoing care.     Please let the patient know that this is an educational consult clinic.  For that reason, Dr. Benjamin and a resident will be seeing the patient together virtually.     If you are unable to reach the patient after two phone calls, please send a letter and close this encounter.    Thank you!

## 2021-01-13 NOTE — PROGRESS NOTES
Preceptor Attestation:  I have reviewed and agree with the behavioral health fellow's documentation for this video/phone visit.  I did not see the patient.  Supervising Clinical Psychologist:  Yanelis Kimbrough PSYD LP

## 2021-10-09 ENCOUNTER — HEALTH MAINTENANCE LETTER (OUTPATIENT)
Age: 31
End: 2021-10-09

## 2022-09-17 ENCOUNTER — HEALTH MAINTENANCE LETTER (OUTPATIENT)
Age: 32
End: 2022-09-17

## 2023-01-23 ENCOUNTER — HEALTH MAINTENANCE LETTER (OUTPATIENT)
Age: 33
End: 2023-01-23

## 2023-05-26 NOTE — TELEPHONE ENCOUNTER
Called patient. Per phone message: Patient is not accepting calls at his time. Verified number dialed. Sending letter.   
Mental Health Referral:  Please schedule with Dr. Kimbrough      If you are unable to reach the patient after two phone attempts, please send a letter and close the encounter.      Thank you!    
RUBYM with name and callback number.     Kamala Irwin, CMA    
No

## 2023-12-07 ENCOUNTER — APPOINTMENT (OUTPATIENT)
Dept: CT IMAGING | Facility: CLINIC | Age: 33
End: 2023-12-07
Attending: EMERGENCY MEDICINE
Payer: COMMERCIAL

## 2023-12-07 ENCOUNTER — HOSPITAL ENCOUNTER (EMERGENCY)
Facility: CLINIC | Age: 33
Discharge: HOME | End: 2023-12-07
Attending: EMERGENCY MEDICINE | Admitting: EMERGENCY MEDICINE
Payer: COMMERCIAL

## 2023-12-07 VITALS
SYSTOLIC BLOOD PRESSURE: 99 MMHG | DIASTOLIC BLOOD PRESSURE: 68 MMHG | WEIGHT: 140 LBS | OXYGEN SATURATION: 100 % | RESPIRATION RATE: 14 BRPM | BODY MASS INDEX: 23.9 KG/M2 | HEART RATE: 88 BPM | HEIGHT: 64 IN | TEMPERATURE: 98.3 F

## 2023-12-07 DIAGNOSIS — K60.30 PERIANAL FISTULA: Primary | ICD-10-CM

## 2023-12-07 DIAGNOSIS — K62.89 RECTAL PAIN: ICD-10-CM

## 2023-12-07 DIAGNOSIS — K59.00 CONSTIPATION, UNSPECIFIED CONSTIPATION TYPE: ICD-10-CM

## 2023-12-07 LAB
ANION GAP SERPL CALCULATED.3IONS-SCNC: 9 MMOL/L (ref 7–15)
BASOPHILS # BLD AUTO: 0 10E3/UL (ref 0–0.2)
BASOPHILS NFR BLD AUTO: 0 %
BUN SERPL-MCNC: 9.4 MG/DL (ref 6–20)
CALCIUM SERPL-MCNC: 8.7 MG/DL (ref 8.6–10)
CHLORIDE SERPL-SCNC: 107 MMOL/L (ref 98–107)
CREAT SERPL-MCNC: 0.58 MG/DL (ref 0.51–0.95)
DEPRECATED HCO3 PLAS-SCNC: 21 MMOL/L (ref 22–29)
EGFRCR SERPLBLD CKD-EPI 2021: >90 ML/MIN/1.73M2
EOSINOPHIL # BLD AUTO: 0 10E3/UL (ref 0–0.7)
EOSINOPHIL NFR BLD AUTO: 1 %
ERYTHROCYTE [DISTWIDTH] IN BLOOD BY AUTOMATED COUNT: 15 % (ref 10–15)
GLUCOSE SERPL-MCNC: 97 MG/DL (ref 70–99)
HCG SERPL QL: NEGATIVE
HCT VFR BLD AUTO: 34.2 % (ref 35–47)
HGB BLD-MCNC: 10.8 G/DL (ref 11.7–15.7)
HOLD SPECIMEN: NORMAL
IMM GRANULOCYTES # BLD: 0 10E3/UL
IMM GRANULOCYTES NFR BLD: 0 %
LYMPHOCYTES # BLD AUTO: 2 10E3/UL (ref 0.8–5.3)
LYMPHOCYTES NFR BLD AUTO: 25 %
MCH RBC QN AUTO: 25.4 PG (ref 26.5–33)
MCHC RBC AUTO-ENTMCNC: 31.6 G/DL (ref 31.5–36.5)
MCV RBC AUTO: 81 FL (ref 78–100)
MONOCYTES # BLD AUTO: 0.4 10E3/UL (ref 0–1.3)
MONOCYTES NFR BLD AUTO: 5 %
NEUTROPHILS # BLD AUTO: 5.4 10E3/UL (ref 1.6–8.3)
NEUTROPHILS NFR BLD AUTO: 69 %
NRBC # BLD AUTO: 0 10E3/UL
NRBC BLD AUTO-RTO: 0 /100
PLATELET # BLD AUTO: 248 10E3/UL (ref 150–450)
POTASSIUM SERPL-SCNC: 4.1 MMOL/L (ref 3.4–5.3)
RBC # BLD AUTO: 4.25 10E6/UL (ref 3.8–5.2)
SODIUM SERPL-SCNC: 137 MMOL/L (ref 135–145)
WBC # BLD AUTO: 7.9 10E3/UL (ref 4–11)

## 2023-12-07 PROCEDURE — 84703 CHORIONIC GONADOTROPIN ASSAY: CPT | Performed by: EMERGENCY MEDICINE

## 2023-12-07 PROCEDURE — 250N000011 HC RX IP 250 OP 636: Mod: JZ | Performed by: EMERGENCY MEDICINE

## 2023-12-07 PROCEDURE — 80048 BASIC METABOLIC PNL TOTAL CA: CPT | Performed by: EMERGENCY MEDICINE

## 2023-12-07 PROCEDURE — 36415 COLL VENOUS BLD VENIPUNCTURE: CPT | Performed by: EMERGENCY MEDICINE

## 2023-12-07 PROCEDURE — 85025 COMPLETE CBC W/AUTO DIFF WBC: CPT | Performed by: EMERGENCY MEDICINE

## 2023-12-07 PROCEDURE — 250N000011 HC RX IP 250 OP 636: Performed by: EMERGENCY MEDICINE

## 2023-12-07 PROCEDURE — 99285 EMERGENCY DEPT VISIT HI MDM: CPT | Mod: 25

## 2023-12-07 PROCEDURE — 96374 THER/PROPH/DIAG INJ IV PUSH: CPT | Mod: 59

## 2023-12-07 PROCEDURE — 250N000009 HC RX 250: Performed by: EMERGENCY MEDICINE

## 2023-12-07 PROCEDURE — 72193 CT PELVIS W/DYE: CPT

## 2023-12-07 RX ORDER — IOPAMIDOL 755 MG/ML
71 INJECTION, SOLUTION INTRAVASCULAR ONCE
Status: COMPLETED | OUTPATIENT
Start: 2023-12-07 | End: 2023-12-07

## 2023-12-07 RX ORDER — HYDROMORPHONE HYDROCHLORIDE 1 MG/ML
0.5 INJECTION, SOLUTION INTRAMUSCULAR; INTRAVENOUS; SUBCUTANEOUS
Status: DISCONTINUED | OUTPATIENT
Start: 2023-12-07 | End: 2023-12-07 | Stop reason: HOSPADM

## 2023-12-07 RX ORDER — IBUPROFEN 600 MG/1
600 TABLET, FILM COATED ORAL EVERY 6 HOURS PRN
Qty: 60 TABLET | Refills: 0 | Status: SHIPPED | OUTPATIENT
Start: 2023-12-07

## 2023-12-07 RX ORDER — OXYCODONE HYDROCHLORIDE 5 MG/1
5 TABLET ORAL EVERY 6 HOURS PRN
Qty: 10 TABLET | Refills: 0 | Status: SHIPPED | OUTPATIENT
Start: 2023-12-07 | End: 2023-12-10

## 2023-12-07 RX ORDER — ACETAMINOPHEN 500 MG
500-1000 TABLET ORAL EVERY 6 HOURS PRN
Qty: 60 TABLET | Refills: 0 | Status: SHIPPED | OUTPATIENT
Start: 2023-12-07 | End: 2023-12-15

## 2023-12-07 RX ORDER — KETOROLAC TROMETHAMINE 15 MG/ML
15 INJECTION, SOLUTION INTRAMUSCULAR; INTRAVENOUS ONCE
Status: COMPLETED | OUTPATIENT
Start: 2023-12-07 | End: 2023-12-07

## 2023-12-07 RX ADMIN — IOPAMIDOL 71 ML: 755 INJECTION, SOLUTION INTRAVENOUS at 09:17

## 2023-12-07 RX ADMIN — SODIUM CHLORIDE 61 ML: 9 INJECTION, SOLUTION INTRAVENOUS at 09:17

## 2023-12-07 RX ADMIN — KETOROLAC TROMETHAMINE 15 MG: 15 INJECTION, SOLUTION INTRAMUSCULAR; INTRAVENOUS at 10:20

## 2023-12-07 ASSESSMENT — ACTIVITIES OF DAILY LIVING (ADL)
ADLS_ACUITY_SCORE: 35
ADLS_ACUITY_SCORE: 35

## 2023-12-07 NOTE — ED PROVIDER NOTES
"  History     Chief Complaint:  Rectal Problem     The history is provided by the patient.      Kylee Martinez is a 33 year old female who presents with rectal pain. Kylee reports she passed a bowel movement last night at 1700 and felt a tearing sensation and developed rectal bleeding. She took ibuprofen, which relieved some pain, but she notes it was throbbing. She denies fevers, vomiting, or diarrhea. She mentions she had a perirectal abscess drained under anesthesia about 3 years ago next to the area that is currently painful and swollen.     Independent Historian:   None - Patient Only    Medications:    Senna  Pepcid    Past Medical History:    Biliary colic  Sepsis    Physical Exam   Patient Vitals for the past 24 hrs:   BP Temp Temp src Pulse Resp SpO2 Height Weight   12/07/23 1030 -- -- -- -- -- 100 % -- --   12/07/23 0840 99/68 98.3  F (36.8  C) Oral 88 14 99 % -- --   12/07/23 0839 99/68 98.3  F (36.8  C) Oral 86 16 99 % 1.626 m (5' 4\") 63.5 kg (140 lb)      Physical Exam  General: Alert, appears well-developed and well-nourished. Cooperative.     In mild distress  HEENT:  Head:  Atraumatic  Ears:  External ears are normal  Mouth/Throat:  Oropharynx is without erythema or exudate and mucous membranes are moist.   Eyes:   Conjunctivae normal and EOM are normal. No scleral icterus.  CV:  Normal rate, regular rhythm, normal heart sounds and radial pulses are 2+ and symmetric.  No murmur.  Resp:  Breath sounds are clear bilaterally    Non-labored, no retractions or accessory muscle use  GI:  Abdomen is soft, no distension, no tenderness. No rebound or guarding.  No CVA tenderness bilaterally  Rectal: Exam performed in presence of female ED technician.  Patient with a obvious external hemorrhoid with no active bleeding.  There is significant tenderness to palpation of the skin and tissue around the anal verge.  Digital rectal exam unable to be performed due to severe pain on exam.  Limited examination given " pain.  MS:  Normal range of motion. No edema.    Normal strength in all 4 extremities.     Back atraumatic.    No midline cervical, thoracic, or lumbar tenderness  Skin:  Warm and dry.  No rash or lesions noted.  Neuro:   Alert. Normal strength.  GCS: 15  Psych: Normal mood and affect.    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 No   associated abscess.       LLUVIA MARTIN MD            SYSTEM ID:  [id number]         Report per radiology    Laboratory:  Labs Ordered and Resulted from Time of ED Arrival to Time of ED Departure   BASIC METABOLIC PANEL - Abnormal       Result Value    Sodium 137      Potassium 4.1      Chloride 107      Carbon Dioxide (CO2) 21 (*)     Anion Gap 9      Urea Nitrogen 9.4      Creatinine 0.58      GFR Estimate >90      Calcium 8.7      Glucose 97     CBC WITH PLATELETS AND DIFFERENTIAL - Abnormal    WBC Count 7.9      RBC Count 4.25      Hemoglobin 10.8 (*)     Hematocrit 34.2 (*)     MCV 81      MCH 25.4 (*)     MCHC 31.6      RDW 15.0      Platelet Count 248      % Neutrophils 69      % Lymphocytes 25      % Monocytes 5      % Eosinophils 1      % Basophils 0      % Immature Granulocytes 0      NRBCs per 100 WBC 0      Absolute Neutrophils 5.4      Absolute Lymphocytes 2.0      Absolute Monocytes 0.4      Absolute Eosinophils 0.0      Absolute Basophils 0.0      Absolute Immature Granulocytes 0.0      Absolute NRBCs 0.0     HCG QUALITATIVE PREGNANCY - Normal    hCG Serum Qualitative Negative       Procedures   None    Emergency Department Course & Assessments:    Interventions:  Medications   HYDROmorphone (PF) (DILAUDID) injection 0.5 mg (0.5 mg Intravenous Not Given 12/7/23 4527)   iopamidol (ISOVUE-370) solution 71 mL (71 mLs Intravenous $Given 12/7/23 4200)   sodium chloride 0.9 % bag 100mL (61 mLs  Intravenous $Given 12/7/23 0917)   ketorolac (TORADOL) injection 15 mg (15 mg Intravenous $Given 12/7/23 1020)     Assessments:  0852 I obtained history and examined the patient as noted above.   1018 I rechecked the patient and explained findings.     Independent Interpretation (X-rays, CTs, rhythm strip):  None    Consultations/Discussion of Management or Tests:  1121 I spoke with Dr. Lang of colorectal surgery regarding the patient's history and presentation in the emergency department today. They will come see the patient.   1223 I spoke with colorectal surgery regarding their assessment of the patient. The patient will follow-up with outpatient services.    Social Determinants of Health affecting care:   None    Disposition:  The patient was discharged to home.     Impression & Plan    CMS Diagnoses: None    Medical Decision Making:  Patient is a 33-year-old female who presents with rectal pain since last night.  Broad workup performed today.  Exam is quite limited given severe pain on palpation.  CT scan shows a low left lateral perianal fistula likely intersphincteric.  This extends to the medial left gluteal fold.  No associated abscess.  Laboratory work unremarkable with no leukocytosis.  Stable vital signs here.  Patient had some improvement after dose of Toradol.  We had colorectal surgery consult on the patient here in the emergency department.  They recommended close outpatient follow-up for further evaluation and management of perianal fistula.  Indication for antibiotics at this time is no sign of infection or abscess.  Patient does have some chronic constipation so we discussed high-fiber diet and use of Metamucil or Citrucel to encourage regular bowel movements.  Additionally we discussed pain control with Tylenol, ibuprofen, and as needed oxycodone for breakthrough pain.  Opiate pain medication precautions given for home.  After all questions answered & return precautions understood, discharged  home.     Diagnosis:    ICD-10-CM    1. Perianal fistula  K60.3       2. Rectal pain  K62.89       3. Constipation, unspecified constipation type  K59.00           Discharge Medications:  New Prescriptions    ACETAMINOPHEN (TYLENOL) 500 MG TABLET    Take 1-2 tablets (500-1,000 mg) by mouth every 6 hours as needed for pain or fever    IBUPROFEN (ADVIL/MOTRIN) 600 MG TABLET    Take 1 tablet (600 mg) by mouth every 6 hours as needed for moderate pain    OXYCODONE (ROXICODONE) 5 MG TABLET    Take 1 tablet (5 mg) by mouth every 6 hours as needed for severe pain or breakthrough pain        Scribe Disclosure:  Lauro PETERSON Hailie, am serving as a scribe at 8:56 AM on 12/7/2023 to document services personally performed by Jone Quinonez MD based on my observations and the provider's statements to me.   12/7/2023   Jone Quinonez MD White, Scott, MD  12/07/23 6134

## 2023-12-07 NOTE — ED TRIAGE NOTES
Multiple days of pain and swelling  to rectal area. Patient reports pain is now 10/10 and it's draining bloody drainage. Patient also reporting chills.    Has allergy to antibiotic but unsure which one.     Triage Assessment (Adult)       Row Name 12/07/23 0836          Triage Assessment    Airway WDL WDL        Respiratory WDL    Respiratory WDL WDL        Skin Circulation/Temperature WDL    Skin Circulation/Temperature WDL WDL        Cardiac WDL    Cardiac WDL WDL        Peripheral/Neurovascular WDL    Peripheral Neurovascular WDL WDL        Cognitive/Neuro/Behavioral WDL    Cognitive/Neuro/Behavioral WDL WDL

## 2023-12-07 NOTE — Clinical Note
Kylee Martinez was seen and treated in our emergency department on 12/7/2023.  She may return to work on 12/08/2023.       If you have any questions or concerns, please don't hesitate to call.      Jone Quinonez MD

## 2023-12-07 NOTE — CONSULTS
[facility]  Colon and Rectal Surgery Consult Note  Name: Kylee Martinez    MRN: [id number]  YOB: 1990    Age: 33 year old  Date of admission: 12/7/2023  Primary care provider: Floyd Martinez             History of Present Illness:   Kylee Martinez is a 33 year old female with a history of a tori-anal abscess s/p I&D who presents with acute onset rectal pain. She reports drainage of a tori-anal abscess ~5 years ago. Since then, she has been seen intermittently in the ED for rectal pain and bleeding but has not undergone any additional procedures -- based on chart review she has been found to have constipation and large hemorrhoids. She reports she has  bowel movement every 2-3 days. She strains with bowel movements. She intermittently takes stool softeners and does not take fiber.     She reports developing acute onset rectal pain and bleeding yesterday evening and presented to the ED for evaluation. In the ED, she has persistent pain but no ongoing drainage. Her last BM was yesterday. She has never had a colonoscopy.     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 WBC is normal and she is afebrile.                 Past Medical History:   No past medical history on file.          Past Surgical History:   No past surgical history on file.            Social History:     Social History     Tobacco Use    Smoking status: Not on file    Smokeless tobacco: Not on file   Substance Use Topics    Alcohol use: Not on file             Family History:   No family history on file.          Allergies:     Allergies   Allergen Reactions    Other Drug Allergy (See Comments)      Allergy to unknown antibiotic             Medications:   N/A          Review of Systems:     A comprehensive greater than 10 system review of systems was carried out.  Pertinent positives and negatives are noted above.  Otherwise negative for contributory info.            Physical Exam:   Blood  "pressure 99/68, pulse 88, temperature 98.3  F (36.8  C), temperature source Oral, resp. rate 14, height 5' 4\", weight 140 lb, SpO2 100%.  No intake or output data in the 24 hours ending 12/07/23 1220  Exam:  GENERAL: Awake alert and oriented  LUNGS: Non labored breathing  ABD: soft  EXTREMITIES: warm without edema  RECTAL: normal external anal exam, no evidence of abscess, no induration, no fissure, small external anal skin tags; ALFREDO is poorly tolerated due to anxiety and pain, but no obvious internal opening palpated, no firmness, and no drainage of blood or pus. Internal hemorrhoids are palpated to be of average size. Did not attempt anoscopy          Data:     Recent Results (from the past 24 hour(s))   CT Pelvis Soft Tissue w Contrast    Narrative    CT PELVIS WITH CONTRAST 12/7/2023 9:31 AM     HISTORY: Concern for perianal abscess or deeper soft tissue infection  vs fistula.  Severe rectal pain since last night.    COMPARISON: None available.    TECHNIQUE: Volumetric helical acquisition of axial CT image data were  acquired through the pelvis after administration of 71mL Isovue-370  intravenous contrast. Coronal images reconstructed from axial image  data. Radiation dose for this scan was reduced using automated  exposure control, adjustment of the mA and/or kV according to patient  size, or iterative reconstruction technique.    FINDINGS: Small linear hyperdense tract extends from the left lateral  lower anal canal (approximately 3:00) near the anal verge to the  medial gluteal cleft (2/110-116). No associated drainable fluid  collection or abscess.     Remainder of the visualized bowel is unremarkable.    No pelvic ascites or fluid collection. Urinary bladder and pelvic  structures are unremarkable for patient's age. Left probable  functional cyst.    Pelvic vasculature is patent. Prominent inguinal lymph nodes are  likely reactive.    No aggressive osseous lesion.      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 No  associated abscess.     LLUVIA MARTIN MD         SYSTEM ID:  [id number]       Recent Labs   Lab 12/07/23  0844   WBC 7.9   HGB 10.8*   HCT 34.2*   MCV 81            Assessment and Plan:   33 year old woman with a history of constipation and tori-anal abscess who presents with acute onset rectal pain, with imaging showing left lateral tori-anal fistula tract. There is no evidence of abscess or sepsis and no indication for urgent surgical intervention. There is no bleeding on my exam.    Recommend follow-up in our clinic for re-assessment and consideration of exam under anesthesia if c/f fistula-in-ano persists.     We talked about what causes fistula-in-ano, and that more than 1/3 of people who have a tori-anal abscess can develop a fistula. We talked about concerning signs, such as fever, induration or abscess that warrant immediate evaluation. We also talked about constipation management, including increasing water intake, decreasing caffeine and adding a fiber supplement.     Recommend outpatient follow-up with colorectal surgery.     For questions/paging, please contact the [facility] office at [phone].     Faiza Holden MD Colorectal Surgery Fellow  [facility]  [address]  [place] [address]  T: [phone]  F: [phone]   [email or web address]

## 2024-02-24 ENCOUNTER — HEALTH MAINTENANCE LETTER (OUTPATIENT)
Age: 34
End: 2024-02-24